# Patient Record
Sex: FEMALE | Race: WHITE | NOT HISPANIC OR LATINO | Employment: FULL TIME | URBAN - METROPOLITAN AREA
[De-identification: names, ages, dates, MRNs, and addresses within clinical notes are randomized per-mention and may not be internally consistent; named-entity substitution may affect disease eponyms.]

---

## 2017-01-13 ENCOUNTER — OFFICE VISIT (OUTPATIENT)
Dept: URGENT CARE | Age: 52
End: 2017-01-13
Payer: COMMERCIAL

## 2017-01-13 PROCEDURE — 99203 OFFICE O/P NEW LOW 30 MIN: CPT | Performed by: FAMILY MEDICINE

## 2017-01-17 ENCOUNTER — ALLSCRIPTS OFFICE VISIT (OUTPATIENT)
Dept: OTHER | Facility: OTHER | Age: 52
End: 2017-01-17

## 2017-02-10 ENCOUNTER — ALLSCRIPTS OFFICE VISIT (OUTPATIENT)
Dept: OTHER | Facility: OTHER | Age: 52
End: 2017-02-10

## 2017-02-10 DIAGNOSIS — E78.5 HYPERLIPIDEMIA: ICD-10-CM

## 2017-03-27 ENCOUNTER — ALLSCRIPTS OFFICE VISIT (OUTPATIENT)
Dept: OTHER | Facility: OTHER | Age: 52
End: 2017-03-27

## 2017-06-20 ENCOUNTER — TRANSCRIBE ORDERS (OUTPATIENT)
Dept: LAB | Facility: HOSPITAL | Age: 52
End: 2017-06-20

## 2017-06-20 DIAGNOSIS — Z00.8 ENCOUNTER FOR OTHER GENERAL EXAMINATION: Primary | ICD-10-CM

## 2017-06-21 ENCOUNTER — APPOINTMENT (OUTPATIENT)
Dept: LAB | Facility: HOSPITAL | Age: 52
End: 2017-06-21
Payer: COMMERCIAL

## 2017-06-21 DIAGNOSIS — Z00.8 ENCOUNTER FOR OTHER GENERAL EXAMINATION: ICD-10-CM

## 2017-06-21 LAB
CHOLEST SERPL-MCNC: 189 MG/DL (ref 50–200)
EST. AVERAGE GLUCOSE BLD GHB EST-MCNC: 111 MG/DL
HBA1C MFR BLD: 5.5 % (ref 4.2–6.3)
HDLC SERPL-MCNC: 55 MG/DL (ref 40–60)
LDLC SERPL CALC-MCNC: 119 MG/DL (ref 0–100)
TRIGL SERPL-MCNC: 74 MG/DL

## 2017-06-21 PROCEDURE — 83036 HEMOGLOBIN GLYCOSYLATED A1C: CPT

## 2017-06-21 PROCEDURE — 80061 LIPID PANEL: CPT

## 2017-06-21 PROCEDURE — 36415 COLL VENOUS BLD VENIPUNCTURE: CPT

## 2017-07-05 PROCEDURE — 88305 TISSUE EXAM BY PATHOLOGIST: CPT | Performed by: PHYSICIAN ASSISTANT

## 2017-07-07 ENCOUNTER — LAB REQUISITION (OUTPATIENT)
Dept: LAB | Facility: HOSPITAL | Age: 52
End: 2017-07-07
Payer: COMMERCIAL

## 2017-07-07 DIAGNOSIS — L82.0 INFLAMED SEBORRHEIC KERATOSIS: ICD-10-CM

## 2017-08-10 ENCOUNTER — ALLSCRIPTS OFFICE VISIT (OUTPATIENT)
Dept: OTHER | Facility: OTHER | Age: 52
End: 2017-08-10

## 2017-09-27 ENCOUNTER — ALLSCRIPTS OFFICE VISIT (OUTPATIENT)
Dept: OTHER | Facility: OTHER | Age: 52
End: 2017-09-27

## 2017-09-27 DIAGNOSIS — N94.6 DYSMENORRHEA: ICD-10-CM

## 2017-12-14 ENCOUNTER — ALLSCRIPTS OFFICE VISIT (OUTPATIENT)
Dept: OTHER | Facility: OTHER | Age: 52
End: 2017-12-14

## 2017-12-15 NOTE — PROGRESS NOTES
Assessment  1  URI (upper respiratory infection) (465 9) (J06 9)    Plan  Chronic migraine without aura    · ZOLMitriptan 5 MG Oral Tablet    Discussion/Summary    Tylenol, rest fluids, no need for antibiotics at this time  The treatment plan was reviewed with the patient/guardian  The patient/guardian understands and agrees with the treatment plan      Chief Complaint  Pt here for sore throat, sinus pressure, headache, body chills  History of Present Illness  Cold Symptoms:   Von Flor presents with complaints of sudden onset of constant episodes of moderate cold symptoms  Her symptoms are caused by work contact with URI  Symptoms are worsening  Risk Factors: high exposure occupation  Associated symptoms include nasal congestion,-- post nasal drainage,-- sore throat,-- dry cough,-- headache-- and-- chills, but-- no fever  Review of Systems   Constitutional: feeling poorly-- and-- chills, but-- no fever  ENT: earache,-- sore throat-- and-- nasal discharge  Respiratory: cough  Gastrointestinal: no complaints of abdominal pain, no constipation, no nausea or diarrhea, no vomiting, no bloody stools  Neurological: headache  Active Problems  1  Back strain (847 9) (S39 012A)   2  Chronic migraine without aura (346 70) (G43 709)   3  Chronic tension type headache (339 12) (G44 229)   4  Classic migraine with aura (346 00) (G43 109)   5  Colon cancer screening (V76 51) (Z12 11)   6  Dysmenorrhea (625 3) (N94 6)   7  Encounter for gynecological examination without abnormal finding (V72 31) (Z01 419)   8  Encounter for screening mammogram for malignant neoplasm of breast (V76 12) (Z12 31)   9  Fibromyalgia (729 1) (M79 7)   10  Hyperlipidemia (272 4) (E78 5)   11  Impaired fasting glucose (790 21) (R73 01)   12  Nummular dermatitis (692 9) (L30 0)   13  Obesity (278 00) (E66 9)   14  Other muscle spasm (728 85) (M62 838)   15  Rhus dermatitis (692 6) (L23 7)   16   Routine Gynecological Exam With Cervical Pap Smear (V72 31)   17  Tenderness of hip, left (719 45) (M25 552)   18  Tenderness of hip, right (719 45) (M25 551)   19  Vitamin D insufficiency (268 9) (E55 9)    Past Medical History  1  History of Elevated AST (SGOT) (790 4) (R74 0)   2  History of gestational diabetes (V12 21) (Z86 32)   3  History of low back pain (V13 59) (Z87 39)   4  History of obesity (V12 29) (Z86 39)   5  History of vacuum extraction assisted delivery (V45 89) (Z98 890)   6  History of Lateral epicondylitis of right elbow (726 32) (M77 11)   7  History of Migraine (346 90) (G43 909)   8  History of Other acute sinusitis (461 8) (J01 80)   9  History of Ovarian cyst (620 2) (N83 20)   10  History of Sebopsoriasis (692 9) (L30 9)   11  History of Summary Of Previous Pregnancies  1  (Total No )  Active Problems And Past Medical History Reviewed: The active problems and past medical history were reviewed and updated today  Family History  Mother    1  Family history of Anxiety and depression   2  Family history of Hypertension (V17 49)  Father    3  Family history of Hypertension (V17 49)  Brother    4  Family history of Hypertension   5  Family history of Psoriasis  Maternal Grandmother    6  Family history of Alzheimer Disease   7  Family history of Bone Cancer  Paternal Grandmother    6  Family history of Stroke Syndrome (V17 1)  Maternal Grandfather    9  Family history of Acute Myocardial Infarction (V17 3)   10  Family history of Psoriasis  Paternal Grandfather    6  Family history of Diabetes Mellitus (V18 0)  Paternal Uncle    15  Family history of Coronary Artery Disease (V17 49)  Family History Reviewed: The family history was reviewed and updated today         Social History   · Aerobic classes   · Being A Social Drinker   · Denied: History of Coffee   · Daily Tea Consumption (1  Cups/Day)   · Drinks beer (V49 89) (Z78 9)   · Denied: History of Drug Use   · Exercise: Walking   · Exercises moderately less than 3 times a week   · Former smoker (V15 82) (U83 975)   · Denied: History of domestic violence   ·    · Sexually Active   · Wine Consumption (1  Glasses/Day)  The social history was reviewed and updated today  Surgical History  1  History of Laparoscopy (Diagnostic)   2  History of Tubal Ligation  Surgical History Reviewed: The surgical history was reviewed and updated today  Current Meds   1  Atorvastatin Calcium 10 MG Oral Tablet; TAKE 1 TABLET BY MOUTH DAILY AS DIRECTED; Therapy: 65JLO3206 to (Evaluate:36Gbx5072)  Requested for: 50Wpn1303; Last Rx:24Drd1897 Ordered   2  B-2 100 MG Oral Tablet; Therapy: 56Nal9889 to Recorded   3  Biotin 300 MCG Oral Tablet; Therapy: 63Nzy0365 to Recorded   4  Calcium TABS; Therapy: (Tilda Dilling) to Recorded   5  Protriptyline HCl - 10 MG Oral Tablet; Take 1 tablet daily; Therapy: 62FVX7096 to (Evaluate:79Znw9200)  Requested for: 10Aug2017; Last Rx:05Uiw1780 Ordered   6  Vitamin D 1000 UNIT CAPS; Therapy: (Tilda Dilling) to Recorded   7  ZOLMitriptan 5 MG Oral Tablet; TAKE 1 TABLET AT ONSET OF HEADACHE, MAY REPEAT ONCE AFTER 2 HOURS, MAX OF 2 TABLETS PER DAY; Therapy: 02UQC7140 to (Evaluate:09Oct2017)  Requested for: 10Aug2017; Last Rx:40Nkw4235 Ordered    The medication list was reviewed and updated today  Allergies  1  No Known Drug Allergies  2  No Known Environmental Allergies   3  No Known Food Allergies    Vitals   Recorded: 58UWZ3909 12:12PM   Temperature 97 7 F, Temporal   Heart Rate 98   Respiration 16   Systolic 432, Sitting   Diastolic 902, Sitting   Height 5 ft 8 in   Weight 217 lb 8 oz   BMI Calculated 33 07   BSA Calculated 2 12     Physical Exam   Constitutional  General appearance: Abnormal   acutely ill,-- uncomfortable-- and-- obese  Ears, Nose, Mouth, and Throat  Otoscopic examination: Tympanic membranes translucent with normal light reflex  Canals patent without erythema     Nasal mucosa, septum, and turbinates: Abnormal  The bilateral nasal mucosa was boggy-- and-- edematous  -- mild facial swelling  Oropharynx: Normal with no erythema, edema, exudate or lesions  Pulmonary  Auscultation of lungs: Clear to auscultation  Lymphatic  Palpation of lymph nodes in neck: No lymphadenopathy  Message  Return to work or school:   Shaniqua Steven is under my professional care  She was seen in my office on 12/14/17   She is able to return to work on  12/18/17            Future Appointments    Date/Time Provider Specialty Site   02/21/2018 03:30 PM Bri Andersen MD [facility] 21     Signatures   Electronically signed by :  Michele Casiano MD; Dec 14 2017 12:56PM EST                       (Author)

## 2017-12-18 ENCOUNTER — TRANSCRIBE ORDERS (OUTPATIENT)
Dept: RADIOLOGY | Facility: HOSPITAL | Age: 52
End: 2017-12-18

## 2017-12-18 ENCOUNTER — ALLSCRIPTS OFFICE VISIT (OUTPATIENT)
Dept: OTHER | Facility: OTHER | Age: 52
End: 2017-12-18

## 2017-12-18 ENCOUNTER — HOSPITAL ENCOUNTER (OUTPATIENT)
Dept: RADIOLOGY | Facility: HOSPITAL | Age: 52
Discharge: HOME | End: 2017-12-18
Payer: COMMERCIAL

## 2017-12-18 DIAGNOSIS — J04.0 ACUTE LARYNGITIS: ICD-10-CM

## 2017-12-18 PROCEDURE — 71020 HB CHEST X-RAY 2VW FRONTAL&LATL: CPT

## 2017-12-20 NOTE — PROGRESS NOTES
Assessment  1  Cough (786 2) (R05)  2  Acute laryngitis (464 00) (J04 0)    Plan  Acute laryngitis    · Start: Azithromycin 500 MG Oral Tablet; TAKE 1 TABLET DAILY UNTIL FINISHED   · * XR CHEST PA & LATERAL; Status:Active; Requested for:33Mqa4333;     Chief Complaint  was in last week sx's not improving cough/headaches/loss of voice/body aches/chills/sinus pressure      History of Present Illness  recheck from previous visit, pt feels worse  now with laryngitis  and feels tight in chest with activity not really coughing1        1 Amended By: Blanca Winter; Dec 19 2017 9:36 AM EST    Review of Systems   Constitutional:1  feeling poorly1   Eyes:1  No complaints of eye pain, no red eyes, no eyesight problems, no discharge, no dry eyes, no itching of eyes1   ENT:1  hoarseness1 , but-- no complaints of earache, no loss of hearing, no nose bleeds, no nasal discharge, no sore throat, no hoarseness1   Cardiovascular:1  No complaints of slow heart rate, no fast heart rate, no chest pain, no palpitations, no leg claudication, no lower extremity edema1   Respiratory:1  shortness of breath during exertion1   Gastrointestinal:1  No complaints of abdominal pain, no constipation, no nausea or vomiting, no diarrhea, no bloody stools1   Genitourinary:1  No complaints of dysuria, no incontinence, no pelvic pain, no dysmenorrhea, no vaginal discharge or bleeding1   Musculoskeletal:1  No complaints of arthralgias, no myalgias, no joint swelling or stiffness, no limb pain or swelling1   Integumentary:1  No complaints of skin rash or lesions, no itching, no skin wounds, no breast pain or lump1   Neurological:1  No complaints of headache, no confusion, no convulsions, no numbness, no dizziness or fainting, no tingling, no limb weakness, no difficulty walking1   Psychiatric:1  Not suicidal, no sleep disturbance, no anxiety or depression, no change in personality, no emotional problems1     Endocrine:1  No complaints of proptosis, no hot flashes, no muscle weakness, no deepening of the voice, no feelings of weakness1   Hematologic/Lymphatic:1  No complaints of swollen glands, no swollen glands in the neck, does not bleed easily, does not bruise easily1         1 Amended By: Oliver Smith; Dec 19 2017 9:39 AM EST    Active Problems  1  Back strain (847 9) (S39 012A)  2  Chronic migraine without aura (346 70) (G43 709)  3  Chronic tension type headache (339 12) (G44 229)  4  Classic migraine with aura (346 00) (G43 109)  5  Colon cancer screening (V76 51) (Z12 11)  6  Dysmenorrhea (625 3) (N94 6)  7  Encounter for gynecological examination without abnormal finding (V72 31) (Z01 419)  8  Encounter for screening mammogram for malignant neoplasm of breast (V76 12) (Z12 31)  9  Fibromyalgia (729 1) (M79 7)  10  Hyperlipidemia (272 4) (E78 5)  11  Impaired fasting glucose (790 21) (R73 01)  12  Nummular dermatitis (692 9) (L30 0)  13  Obesity (278 00) (E66 9)  14  Other muscle spasm (728 85) (M62 838)  15  Rhus dermatitis (692 6) (L23 7)  16  Routine Gynecological Exam With Cervical Pap Smear (V72 31)  17  Tenderness of hip, left (719 45) (M25 552)  18  Tenderness of hip, right (719 45) (M25 551)  19  URI (upper respiratory infection) (465 9) (J06 9)  20  Vitamin D insufficiency (268 9) (E55 9)    Past Medical History  1  History of Elevated AST (SGOT) (790 4) (R74 0)  2  History of gestational diabetes (V12 21) (Z86 32)  3  History of low back pain (V13 59) (Z87 39)  4  History of obesity (V12 29) (Z86 39)  5  History of vacuum extraction assisted delivery (V45 89) (Z98 890)  6  History of Lateral epicondylitis of right elbow (726 32) (M77 11)  7  History of Migraine (346 90) (G43 909)  8  History of Other acute sinusitis (461 8) (J01 80)  9  History of Ovarian cyst (620 2) (N83 20)  10  History of Sebopsoriasis (692 9) (L30 9)  11   History of Summary Of Previous Pregnancies  1  (Total No )    The active problems and past medical history were reviewed and updated today  Surgical History  1  History of Laparoscopy (Diagnostic)  2  History of Tubal Ligation    Family History  Mother   1  Family history of Anxiety and depression  2  Family history of Hypertension (V17 49)  Father   3  Family history of Hypertension (V17 49)  Brother   4  Family history of Hypertension  5  Family history of Psoriasis  Maternal Grandmother   6  Family history of Alzheimer Disease  7  Family history of Bone Cancer  Paternal Grandmother   6  Family history of Stroke Syndrome (V17 1)  Maternal Grandfather   9  Family history of Acute Myocardial Infarction (V17 3)  10  Family history of Psoriasis  Paternal Grandfather   6  Family history of Diabetes Mellitus (V18 0)  Paternal Uncle   15  Family history of Coronary Artery Disease (V17 49)    Social History   · Aerobic classes   · Being A Social Drinker   · Denied: History of Coffee   · Daily Tea Consumption (1  Cups/Day)   · Drinks beer (V49 89) (Z78 9)   · Denied: History of Drug Use   · Exercise: Walking   · Exercises moderately less than 3 times a week   · Former smoker (V15 82) (Z87 891)   · Denied: History of domestic violence   ·    · Sexually Active   · Wine Consumption (1  Glasses/Day)  The social history was reviewed and updated today  The social history was reviewed and is unchanged  Current Meds  1  Atorvastatin Calcium 10 MG Oral Tablet; TAKE 1 TABLET BY MOUTH DAILY AS DIRECTED; Therapy: 98AMR8224 to (Evaluate:45Bxx8837)  Requested for: 67Tbm5721; Last Rx:83Yhv4461 Ordered  2  B-2 100 MG Oral Tablet; Therapy: 49Foz7469 to Recorded  3  Biotin 300 MCG Oral Tablet; Therapy: 76Gck7534 to Recorded  4  Calcium TABS; Therapy: (Aleyda Casillas) to Recorded  5  Protriptyline HCl - 10 MG Oral Tablet; Take 1 tablet daily; Therapy: 53BQR3383 to (Evaluate:87Eja0361)  Requested for: 10Aug2017; Last Rx:10Aug2017 Ordered  6  Vitamin D 1000 UNIT CAPS;  Therapy: (Aleyda Casillas) to Recorded    The medication list was reviewed and updated today  Allergies  1  No Known Drug Allergies  2  No Known Environmental Allergies  3  No Known Food Allergies    Vitals  Vital Signs    Recorded: 93DDA1427 11:30AM   Temperature 97 9 F   Heart Rate 84   Respiration 16   Systolic 544   Diastolic 92   Height 5 ft 8 in   Weight 217 lb 12 8 oz   BMI Calculated 33 12   BSA Calculated 2 12     Physical Exam   Constitutional1   General appearance: No acute distress, well appearing and well nourished  1   Eyes1   Pupils and irises: Equal, round and reactive to light  1   Ears, Nose, Mouth, and Throat  Otoscopic examination: Abnormal  1  The right tympanic membrane1  had a diminished light reflex1   The right external canal1  was normal1   The left external canal1  was normal1   Oropharynx: Normal with no erythema, edema, exudate or lesions  1   Pulmonary1   Auscultation of lungs: Abnormal  1  Auscultation of the lungs revealed1  decreased breath sounds diffusely1   Cardiovascular1   Auscultation of heart: Normal rate and rhythm, normal S1 and S2, without murmurs  1   Examination of extremities for edema and/or varicosities: Normal 1   Lymphatic1   Palpation of lymph nodes in neck: No lymphadenopathy  1   Skin1   Skin and subcutaneous tissue: Normal without rashes or lesions  1          1 Amended By: Chela Guzman; Dec 19 2017 9:40 AM EST    Health Management  Colon cancer screening   COLONOSCOPY (GI, SURG); every 5 years; Last 10CWV8933; Next Due: 70TVT0706; Active  Encounter for preventive health examination   PELVIC EXAM; every 1 year; Next Due: 91CSI4099; Overdue  Encounter for screening mammogram for malignant neoplasm of breast   Digital Bilateral Screening Mammogram With CAD; every 1 year; Last 69DEF6170; Next Due:05Fwh4517; Overdue  History of Screening for human papillomavirus (HPV)   (1) THIN PREP PAP WITH IMAGING; every 3 years; Last 12HTP8026; Next Due: 52Fzf1923;  Overdue  Routine Gynecological Exam With Cervical Pap Smear   (1) THIN PREP PAP WITH IMAGING; every 3 years; Last 56DUN1681; Next Due: 39Jac0736; Overdue    Message    Shaniqua Steven is under my professional care   She was seen in my office on 112/18/2017   She is able to return to work on  12/21/2017            Future Appointments    Date/Time Provider Specialty Site   02/21/2018 03:30 PM Bri Andersen MD Neurology [facility] 21     Signatures   Electronically signed by : Fredy Flores DO; Dec 19 2017  9:41AM EST                       (Author)

## 2017-12-26 ENCOUNTER — GENERIC CONVERSION - ENCOUNTER (OUTPATIENT)
Dept: OTHER | Facility: OTHER | Age: 52
End: 2017-12-26

## 2017-12-29 ENCOUNTER — APPOINTMENT (OUTPATIENT)
Dept: LAB | Facility: CLINIC | Age: 52
End: 2017-12-29
Payer: COMMERCIAL

## 2017-12-29 DIAGNOSIS — N94.6 DYSMENORRHEA: ICD-10-CM

## 2017-12-29 LAB
FSH SERPL-ACNC: 11.8 MIU/ML
LH SERPL-ACNC: 5.4 MIU/ML

## 2017-12-29 PROCEDURE — 36415 COLL VENOUS BLD VENIPUNCTURE: CPT

## 2017-12-29 PROCEDURE — 83002 ASSAY OF GONADOTROPIN (LH): CPT

## 2017-12-29 PROCEDURE — 83001 ASSAY OF GONADOTROPIN (FSH): CPT

## 2017-12-31 ENCOUNTER — GENERIC CONVERSION - ENCOUNTER (OUTPATIENT)
Dept: OTHER | Facility: OTHER | Age: 52
End: 2017-12-31

## 2018-01-09 NOTE — RESULT NOTES
Verified Results  (1) TISSUE EXAM 63FQP6376 12:28PM Isa Bain     Test Name Result Flag Reference   LAB AP CASE REPORT (Report)     Surgical Pathology Report             Case: [id number]                   Authorizing Provider: Manjula Boland MD      Collected:      09/30/2016 1228        Ordering Location:   [address] [place] [address]   Received:      09/30/2016 [facility]                               Pathologist:      Carolann Bee MD                             Specimens:  A) - Polyp, Colorectal, ascending colon                                B) - Polyp, Colorectal, transverse colon                                C) - Polyp, Colorectal, sigmoid colon                                 D) - Polyp, Colorectal, sigmoid colon #2   LAB AP FINAL DIAGNOSIS (Report)     A  Colon, ascending polyp, biopsy:  - Diminutive tubular adenoma, negative for high-grade dysplasia  B  Colon, transverse polyp, biopsy:  - Polypoid portions of colonic mucosa with features most consistent with   early hyperplastic changes, negative for dysplasia or carcinoma  C  Colon, sigmoid polyp, biopsy:  - Benign colonic mucosa, negative for hyperplasia, dysplasia or   carcinoma  D  Colon, sigmoid polyp #2, biopsy:  - Tubular adenoma, negative for high-grade dysplasia  Electronically signed by Carolann Bee MD on 10/4/2016 at 4:02 PM   LAB AP SURGICAL ADDITIONAL INFORMATION (Report)     These tests were developed and their performance characteristics   determined by [facility] or [address] [place] [address]  They may not be cleared or approved by the [place]  Food and   Drug Administration  The FDA has determined that such clearance or   approval is not necessary  These tests are used for clinical purposes  They should not be regarded as investigational or for research   This   laboratory has been approved by [place] [address], designated as a high-complexity   laboratory and is qualified to perform these tests  Interpretation performed at [facility]   LAB AP GROSS DESCRIPTION (Report)     A  The specimen is received in formalin, labeled with the patient's name   and hospital number, and is designated ascending colon polyp  The   specimen consists of a single tan soft tissue fragment measuring 0 3 cm  Entirely submitted  One cassette  B  The specimen is received in formalin, labeled with the patient's name   and hospital number, and is designated transverse colon polyp  The   specimen consists of 3 tan soft tissue fragments each measuring 0 2-0 3   cm  Entirely submitted  One cassette  C  The specimen is received in formalin, labeled with the patient's name   and hospital number, and is designated sigmoid colon polyp  The   specimen consists of 2 tan soft tissue fragments each measuring 0 2 cm  Entirely submitted  One cassette  D  The specimen is received in formalin, labeled with the patient's name   and hospital number, and is designated sigmoid colon polyp #2  The   specimen consists of a single tan soft tissue fragment measuring 0 4 cm  Entirely submitted  One cassette  Note: The estimated total formalin fixation time based upon information   provided by the submitting clinician and the standard processing schedule   is 16 0 hours      MAC

## 2018-01-11 NOTE — RESULT NOTES
Verified Results  (1) RHEUMATOID FACTOR SCREEN 32Iod0005 11:46AM Deepthi Argeulles Order Number: [id number]     Test Name Result Flag Reference   RHEUMATOID FACTOR Negative  Negative

## 2018-01-12 VITALS
HEART RATE: 86 BPM | OXYGEN SATURATION: 98 % | SYSTOLIC BLOOD PRESSURE: 132 MMHG | DIASTOLIC BLOOD PRESSURE: 78 MMHG | BODY MASS INDEX: 32.46 KG/M2 | WEIGHT: 210.38 LBS

## 2018-01-13 VITALS
TEMPERATURE: 98.8 F | DIASTOLIC BLOOD PRESSURE: 86 MMHG | SYSTOLIC BLOOD PRESSURE: 134 MMHG | BODY MASS INDEX: 31.86 KG/M2 | HEIGHT: 68 IN | WEIGHT: 210.2 LBS | HEART RATE: 84 BPM

## 2018-01-13 VITALS
WEIGHT: 216.19 LBS | DIASTOLIC BLOOD PRESSURE: 84 MMHG | SYSTOLIC BLOOD PRESSURE: 130 MMHG | RESPIRATION RATE: 16 BRPM | HEART RATE: 99 BPM | BODY MASS INDEX: 32.76 KG/M2 | HEIGHT: 68 IN

## 2018-01-14 VITALS
DIASTOLIC BLOOD PRESSURE: 102 MMHG | HEART RATE: 82 BPM | HEIGHT: 68 IN | WEIGHT: 214.5 LBS | BODY MASS INDEX: 32.51 KG/M2 | TEMPERATURE: 98.1 F | SYSTOLIC BLOOD PRESSURE: 142 MMHG | RESPIRATION RATE: 16 BRPM

## 2018-01-14 VITALS
HEIGHT: 68 IN | SYSTOLIC BLOOD PRESSURE: 126 MMHG | BODY MASS INDEX: 32.17 KG/M2 | DIASTOLIC BLOOD PRESSURE: 80 MMHG | WEIGHT: 212.25 LBS

## 2018-01-14 NOTE — RESULT NOTES
Verified Results  (1) SED RATE 37Esv1698 11:46AM Deepthi Arguelles Order Number: [id number]     Test Name Result Flag Reference   SED RATE 14 mm/hour  0-20     (1) C-REACTIVE PROTEIN 46ANH9880 11:46AM Georgi Rios    Order Number: [id number]  TW Order Number: [id number] Order Number: [id number] Order Number: [id number] Order Number: [id number]     Test Name Result Flag Reference   C-REACT PROTEIN <3 0 mg/L  <3 0     (1) CBC/PLT/DIFF 05YLQ3188 11:46AM Deepthi Arguelles Order Number: [id number]  TW Order Number: [id number]     Test Name Result Flag Reference   WBC COUNT 4 82 Thousand/uL  4 31-10 16   RBC COUNT 4 53 Million/uL  3 81-5 12   HEMOGLOBIN 14 0 g/dL  11 5-15 4   HEMATOCRIT 40 3 %  34 8-46  1   MCV 89 fL  82-98   MCH 30 9 pg  26 8-34 3   MCHC 34 7 g/dL  31 4-37 4   RDW 13 0 %  11 6-15 1   MPV 10 8 fL  8 9-12 7   PLATELET COUNT 703 Thousands/uL  149-390   nRBC AUTOMATED 0 /100 WBCs     NEUTROPHILS RELATIVE PERCENT 63 %  43-75   LYMPHOCYTES RELATIVE PERCENT 28 %  14-44   MONOCYTES RELATIVE PERCENT 7 %  4-12   EOSINOPHILS RELATIVE PERCENT 1 %  0-6   BASOPHILS RELATIVE PERCENT 1 %  0-1   NEUTROPHILS ABSOLUTE COUNT 3 00 Thousands/?L  1 85-7 62   LYMPHOCYTES ABSOLUTE COUNT 1 37 Thousands/?L  0 60-4 47   MONOCYTES ABSOLUTE COUNT 0 35 Thousand/?L  0 17-1 22   EOSINOPHILS ABSOLUTE COUNT 0 05 Thousand/?L  0 00-0 61   BASOPHILS ABSOLUTE COUNT 0 03 Thousands/?L  0 00-0 10     (1) CK (CPK) 67HCY2255 11:46AM Georgi Rios    Order Number: [id number]  TW Order Number: [id number] Order Number: [id number] Order Number: [id number] Order Number: [id number]     Test Name Result Flag Reference   CK (CPK) 97 U/L       (1) VITAMIN D 25-HYDROXY 88FJG3298 11:46AM Deepthi Petstephen Order Number: [id number]  TW Order Number: [id number]     Test Name Result Flag Reference   VIT D 25-HYDROX 37 2 ng/mL  30 0-100 0   This assay is a certified procedure of the CDC Vitamin D Standardization Certification Program (VDSCP)     Deficiency <20ng/ml   Insufficiency 20-30ng/ml   Sufficient  ng/ml     *Patients undergoing fluorescein dye angiography may retain small amounts of fluorescein in the body for 48-72 hours post procedure  Samples containing fluorescein can produce falsely elevated Vitamin D values  If the patient had this procedure, a specimen should be resubmitted post fluorescein clearance  (1) HEMOGLOBIN A1C 58KDO0991 11:46AM Luiz Parkinsonles   TW Order Number: [id number]  TW Order Number: [id number]     Test Name Result Flag Reference   HEMOGLOBIN A1C 5 6 %  4 2-6 3   EST  AVG  GLUCOSE 114 mg/dl       (1) COMPREHENSIVE METABOLIC PANEL 37EJE8212 09:24IG Luiz Parkinsonles   TW Order Number: [id number]  TW Order Number: [id number] Order Number: [id number] Order Number: [id number] Order Number: [id number]     Test Name Result Flag Reference   GLUCOSE,RANDM 90 mg/dL     If the patient is fasting, the ADA then defines impaired fasting glucose as > 100 mg/dL and diabetes as > or equal to 123 mg/dL     SODIUM 139 mmol/L  136-145   POTASSIUM 3 9 mmol/L  3 5-5 3   CHLORIDE 103 mmol/L  100-108   CARBON DIOXIDE 27 mmol/L  21-32   ANION GAP (CALC) 9 mmol/L  4-13   BLOOD UREA NITROGEN 12 mg/dL  5-25   CREATININE 0 87 mg/dL  0 60-1 30   Standardized to IDMS reference method   CALCIUM 8 8 mg/dL  8 3-10 1   BILI, TOTAL 0 55 mg/dL  0 20-1 00   ALK PHOSPHATAS 69 U/L     ALT (SGPT) 30 U/L  12-78   AST(SGOT) 15 U/L  5-45   ALBUMIN 4 1 g/dL  3 5-5 0   TOTAL PROTEIN 7 3 g/dL  6 4-8 2   eGFR Non-African American      >60 0 ml/min/1 73sq [facility] Disease Education Program recommendations are as follows:  GFR calculation is accurate only with a steady state creatinine  Chronic Kidney disease less than 60 ml/min/1 73 sq  meters  Kidney failure less than 15 ml/min/1 73 sq  meters  (1) LIPID PANEL FASTING W DIRECT LDL REFLEX 53IXS7259 11:46AM Bob Powell Order Number: [id number]  TW Order Number: [id number] Order Number: [id number] Order Number: [id number] Order Number: [id number]     Test Name Result Flag Reference   CHOLESTEROL 177 mg/dL     LDL CHOLESTEROL CALCULATED 107 mg/dL H 0-100   Triglyceride:         Normal              <150 mg/dl       Borderline High    150-199 mg/dl       High               200-499 mg/dl       Very High          >499 mg/dl  Cholesterol:         Desirable        <200 mg/dl      Borderline High  200-239 mg/dl      High             >239 mg/dl  HDL Cholesterol:        High    >59 mg/dL      Low     <41 mg/dL  LDL Cholesterol:        Optimal          <100 mg/dl        Near Optimal     100-129 mg/dl        Above Optimal          Borderline High   130-159 mg/dl          High              160-189 mg/dl          Very High        >189 mg/dl  LDL CALCULATED:    This screening LDL is a calculated result  It does not have the accuracy of the Direct Measured LDL in the monitoring of patients with hyperlipidemia and/or statin therapy  Direct Measure LDL (ARB283) must be ordered separately in these patients  TRIGLYCERIDES 100 mg/dL  <=150   Specimen collection should occur prior to N-Acetylcysteine or Metamizole administration due to the potential for falsely depressed results  HDL,DIRECT 50 mg/dL  40-60   Specimen collection should occur prior to Metamizole administration due to the potential for falsely depressed results

## 2018-01-15 NOTE — RESULT NOTES
Verified Results  (1) THROAT CULTURE (CULTURE, UPPER RESPIRATORY) [id number] 05:41PM Russell Xiao Order Number: [id number]     Test Name Result Flag Reference   CLINICAL REPORT (Report)     Test:        Throat culture  Specimen Type:   Throat  Specimen Date:   9/7/2016 5:41 PM  Result Date:    9/9/2016 8:11 AM  Result Status:   Final result  Resulting Lab:   [address] [address]            Tel: [phone]      CULTURE                                       ------------------                                   Negative for beta-hemolytic Streptococcus

## 2018-01-16 NOTE — RESULT NOTES
Verified Results  (1) PILAR SCREEN W/REFLEX TO TITER/PATTERN [id number] 11:46AM Doug Guzman Order Number: [id number]     Test Name Result Flag Reference   PILAR SCREEN   Negative  Negative

## 2018-01-22 VITALS
HEART RATE: 84 BPM | BODY MASS INDEX: 33.01 KG/M2 | WEIGHT: 217.8 LBS | TEMPERATURE: 97.9 F | DIASTOLIC BLOOD PRESSURE: 92 MMHG | SYSTOLIC BLOOD PRESSURE: 132 MMHG | RESPIRATION RATE: 16 BRPM | HEIGHT: 68 IN

## 2018-01-23 VITALS
RESPIRATION RATE: 16 BRPM | WEIGHT: 217.5 LBS | HEART RATE: 98 BPM | HEIGHT: 68 IN | BODY MASS INDEX: 32.96 KG/M2 | DIASTOLIC BLOOD PRESSURE: 110 MMHG | SYSTOLIC BLOOD PRESSURE: 138 MMHG | TEMPERATURE: 97.7 F

## 2018-01-23 NOTE — MISCELLANEOUS
Message  Return to work or school:   [employer] is under my professional care  She was seen in my office on 12/14/17   She is able to return to work on  12/18/17            Future Appointments    Signatures   Electronically signed by :  Ramiro Rios MD; Dec 14 2017 12:56PM EST                       (Author)

## 2018-01-23 NOTE — MISCELLANEOUS
Message  Return to work or school:   Chidi Mcdowell is under my professional care   She was seen in my office on 112/18/2017   She is able to return to work on  12/21/2017            Future Appointments    Signatures   Electronically signed by : Mir Jacques DO; Dec 18 2017 12:25PM EST                       (Author)    Electronically signed by : Mir Jacques DO; Dec 19 2017  9:41AM EST                       (Author)

## 2018-01-23 NOTE — RESULT NOTES
Verified Results  (1) [address] [facility] [id number] 09:44AM Ingrid [facility] Order Number: [id number]     Test Name Result Flag Reference   FOLLICLE STIMULATING HORMONE 11 8 mIU/mL     FSH:  Menstruating Females: Follicular Phase  5 9-55 9 mIU/mL    Mid-Cycle Phase   5 2-17 5 mIU/mL    Luteal Phase      1 7-9 5  mIU/mL  Postmenopausal Females    On Menopausal Hormone Therapy(HRT) 5 9-72 8   mIU/mL    Untreated                          12 7-132 2 mIU/mL     (1) LH (LEUTINIZING HORMONE) [id number] 09:44AM Ingrid [facility] Order Number: [id number]     Test Name Result Flag Reference   LUTEINIZING HORMONE 5 4 mIU/mL     LH:   Menstruating Females: Follicular [id number] 0-68  8mIU/mL    Mid-Cycle Phase 22 8-76 1 mIU/mL    Luteal Phase0 6-13  5mIU/mL   Postmenopausal Females:     On Menopausal Hormone Therapy(MHT) 1 1-52 4 mIU/mL    Untreated8 6-61 8 mIU/mL

## 2018-01-23 NOTE — RESULT NOTES
Verified Results  * XR CHEST PA & LATERAL [id number] 01:15PM Ayala Saucedo Order Number: [id number]     Test Name Result Flag Reference   XR CHEST PA & LATERAL (Report)     CHEST 2 View     INDICATION: Laryngitis for a week  COMPARISON: 2/5/2006     VIEWS: PA and lateral projections; 2 images     FINDINGS:        Cardiomediastinal silhouette appears stable  The lungs are clear  No pneumothorax or pleural effusion  Visualized osseous structures appear within normal limits for the patient's age  IMPRESSION:     No active pulmonary disease  Workstation performed: [id number]     Signed by:    Sarahy Rodriguez MD   12/20/17

## 2018-03-12 RX ORDER — LANOLIN ALCOHOL/MO/W.PET/CERES
300 CREAM (GRAM) TOPICAL DAILY
COMMUNITY
Start: 2016-08-25

## 2018-03-12 RX ORDER — ZOLMITRIPTAN 5 MG/1
TABLET, FILM COATED ORAL
COMMUNITY
Start: 2017-08-10 | End: 2018-03-14 | Stop reason: ALTCHOICE

## 2018-03-12 RX ORDER — THIAMINE HCL 100 MG
TABLET ORAL DAILY
COMMUNITY
Start: 2016-08-25

## 2018-03-14 ENCOUNTER — OFFICE VISIT (OUTPATIENT)
Dept: NEUROLOGY | Facility: CLINIC | Age: 53
End: 2018-03-14
Payer: COMMERCIAL

## 2018-03-14 VITALS
HEIGHT: 68 IN | SYSTOLIC BLOOD PRESSURE: 130 MMHG | BODY MASS INDEX: 32.54 KG/M2 | WEIGHT: 214.7 LBS | DIASTOLIC BLOOD PRESSURE: 84 MMHG

## 2018-03-14 DIAGNOSIS — G43.009 MIGRAINE WITHOUT AURA AND WITHOUT STATUS MIGRAINOSUS, NOT INTRACTABLE: ICD-10-CM

## 2018-03-14 DIAGNOSIS — G43.709 CHRONIC MIGRAINE WITHOUT AURA WITHOUT STATUS MIGRAINOSUS, NOT INTRACTABLE: Primary | ICD-10-CM

## 2018-03-14 DIAGNOSIS — G44.219 EPISODIC TENSION-TYPE HEADACHE, NOT INTRACTABLE: ICD-10-CM

## 2018-03-14 DIAGNOSIS — M54.2 CERVICALGIA: ICD-10-CM

## 2018-03-14 DIAGNOSIS — R79.89 LOW VITAMIN D LEVEL: ICD-10-CM

## 2018-03-14 PROBLEM — G44.209 TENSION TYPE HEADACHE: Status: ACTIVE | Noted: 2018-03-14

## 2018-03-14 PROCEDURE — 99214 OFFICE O/P EST MOD 30 MIN: CPT | Performed by: PSYCHIATRY & NEUROLOGY

## 2018-03-14 RX ORDER — DEXAMETHASONE 2 MG/1
TABLET ORAL
Qty: 5 TABLET | Refills: 0 | Status: SHIPPED | OUTPATIENT
Start: 2018-03-14 | End: 2018-04-16 | Stop reason: ALTCHOICE

## 2018-03-14 NOTE — PATIENT INSTRUCTIONS
Preventive;   - magnesium oxide 400 mg once a day  - B 2 200 mg once a day  - Protriptyline 10 mg in am daily  Abortive;   - Tylenol 500g 2 tabs at onset and may repeat in 1-2 hours to abort the headaches  If persists for 24 hours then take decadron 2 mg  Lab work ordered

## 2018-03-14 NOTE — PROGRESS NOTES
Patient ID: Austyn Horton is a 47 y o  female  Assessment/Plan:   Problem List Items Addressed This Visit        Cardiovascular and Mediastinum    Chronic migraine without aura without status migrainosus, not intractable - Primary    Relevant Medications    ZOLMitriptan (ZOMIG) 5 MG tablet    Migraine without aura and without status migrainosus, not intractable    Relevant Medications    ZOLMitriptan (ZOMIG) 5 MG tablet       Other    Tension type headache    Relevant Medications    ZOLMitriptan (ZOMIG) 5 MG tablet    Cervicalgia         Preventive;   - magnesium oxide 400 mg once a day  - B 2 200 mg once a day  - Protriptyline 10 mg in am daily  Abortive;   - Tylenol 500g 2 tabs at onset and may repeat in 1-2 hours to abort the headaches  If persists for 24 hours then take decadron 2 mg  Subjective:    HPI   We had the pleasure of evaluating Austyn Horton in neurological follow up today  As you know she is a 47year old right handed female  Neck pain:         -  She states she is doing better in regards to her headaches  Chronic Migraine headaches:   PREVENTIVE: mag, B2, protriptyline   ABORTIVE: naproxen, tylenol, excedrin migraine, Fioricet- not effective  Decadron, She has noted that just taking tyloneol will abort her headaches  If the headahde persists until the next day then she should take a decadron in the morning  Non-Medical/Alternative Treatments used in the past for headaches: none  Headache trigger: stress/tension, fatigue, lack of sleep    How often do the headaches occur -   Since last seen she states she had one headaches which lasted for a week and had to take decadorn 2mg for 5 days     Migraines- 1-2 a month  TTH- once times a week due to work and she is staring at the computer now  What time of the day do the headaches start - evening  How long do the headaches last - TTH- 2 hours; migraines- 2 hr to all day  Where are they located - TTH/migraines- b/l orbits, bilateral frontal  What is the intensity of pain - TTH- average pain level 2-3/10; migraines- up to 4-5/10  Any warning prior to headache and how long do they last - nausea, previously had floaters prior to headache  Describe your usual headache - TTH- pressure; migraines- sharp  Associated symptoms: nausea, lacrimation, vomiting, prefer to lay in dark room, fatigue    The following portions of the patient's history were reviewed and updated as appropriate: allergies, current medications, past family history, past medical history, past social history, past surgical history and problem list        Objective:  Blood pressure 130/84, height 5' 8" (1 727 m), weight 97 4 kg (214 lb 11 2 oz)  Physical Exam   Constitutional: She appears well-developed  Eyes: EOM are normal  Pupils are equal, round, and reactive to light  Neck: Normal range of motion  Neck supple  Cardiovascular: Normal rate  Pulmonary/Chest: Effort normal    Abdominal: Soft  Musculoskeletal: Normal range of motion  Neurological: She has normal strength and normal reflexes  Gait and coordination normal    Skin: Skin is warm  Psychiatric: She has a normal mood and affect  Her speech is normal      Neurological Exam    Mental Status  The patient is alert and disoriented to person, place and time  Her speech is normal  Her language is fluent with no aphasia  She has normal attention span and concentration  Cranial Nerves    CN II: The patient's visual acuity and visual fields are normal   CN III, IV, VI: The patient's pupils are equally round and reactive to light and ocular movements are normal   CN V: The patient has normal facial sensation  CN VII:  The patient has symmetric facial movement  CN VIII:  The patient's hearing is normal   CN IX, X: The patient has symmetric palate movement and normal gag reflex    CN XI: The patient's shoulder shrug strength is normal   CN XII: The patient's tongue is midline without atrophy or fasciculations  Motor  The patient has normal muscle bulk throughout  Her overall muscle tone is normal throughout  Her strength is 5/5 throughout all four extremities  Sensory  The patient's sensation is normal in all four extremities  Reflexes  Deep tendon reflexes are 2+ and symmetric in all four extremities with downgoing toes bilaterally  Gait and Coordination  The patient has normal gait and station and normal casual, toe, heel, and tandem gait  She has normal coordination bilaterally  ROS:  Review of Systems   Constitutional: Negative  Negative for appetite change and fever  HENT: Negative  Negative for hearing loss, tinnitus, trouble swallowing and voice change  Eyes: Negative  Negative for photophobia and pain  Respiratory: Negative  Negative for shortness of breath  Cardiovascular: Negative  Negative for palpitations  Gastrointestinal: Negative  Negative for nausea and vomiting  Endocrine: Negative  Negative for cold intolerance and heat intolerance  Genitourinary: Negative  Negative for dysuria, frequency and urgency  Musculoskeletal: Positive for arthralgias  Negative for myalgias and neck pain  Skin: Negative  Negative for rash  Neurological: Positive for headaches  Negative for dizziness, tremors, seizures, syncope, facial asymmetry, speech difficulty, weakness, light-headedness and numbness  Hematological: Bruises/bleeds easily  Psychiatric/Behavioral: Negative  Negative for confusion, hallucinations and sleep disturbance

## 2018-03-16 DIAGNOSIS — E78.5 HYPERLIPIDEMIA, UNSPECIFIED HYPERLIPIDEMIA TYPE: Primary | ICD-10-CM

## 2018-03-16 RX ORDER — ATORVASTATIN CALCIUM 10 MG/1
10 TABLET, FILM COATED ORAL DAILY
Qty: 90 TABLET | Refills: 2 | Status: SHIPPED | OUTPATIENT
Start: 2018-03-16

## 2018-04-10 ENCOUNTER — OFFICE VISIT (OUTPATIENT)
Dept: FAMILY MEDICINE CLINIC | Facility: CLINIC | Age: 53
End: 2018-04-10
Payer: COMMERCIAL

## 2018-04-10 VITALS
BODY MASS INDEX: 31.61 KG/M2 | DIASTOLIC BLOOD PRESSURE: 74 MMHG | HEART RATE: 74 BPM | RESPIRATION RATE: 16 BRPM | SYSTOLIC BLOOD PRESSURE: 130 MMHG | WEIGHT: 208.6 LBS | HEIGHT: 68 IN

## 2018-04-10 DIAGNOSIS — H10.33 ACUTE CONJUNCTIVITIS OF BOTH EYES, UNSPECIFIED ACUTE CONJUNCTIVITIS TYPE: Primary | ICD-10-CM

## 2018-04-10 PROCEDURE — 99214 OFFICE O/P EST MOD 30 MIN: CPT | Performed by: FAMILY MEDICINE

## 2018-04-10 RX ORDER — OFLOXACIN 3 MG/ML
1 SOLUTION/ DROPS OPHTHALMIC 4 TIMES DAILY
Qty: 5 ML | Refills: 0 | Status: SHIPPED | OUTPATIENT
Start: 2018-04-10 | End: 2018-04-15

## 2018-04-16 ENCOUNTER — OFFICE VISIT (OUTPATIENT)
Dept: FAMILY MEDICINE CLINIC | Facility: CLINIC | Age: 53
End: 2018-04-16
Payer: COMMERCIAL

## 2018-04-16 VITALS
TEMPERATURE: 99.3 F | HEART RATE: 72 BPM | SYSTOLIC BLOOD PRESSURE: 118 MMHG | WEIGHT: 209 LBS | DIASTOLIC BLOOD PRESSURE: 82 MMHG | BODY MASS INDEX: 31.67 KG/M2 | HEIGHT: 68 IN

## 2018-04-16 DIAGNOSIS — J01.00 ACUTE MAXILLARY SINUSITIS, RECURRENCE NOT SPECIFIED: Primary | ICD-10-CM

## 2018-04-16 PROCEDURE — 99213 OFFICE O/P EST LOW 20 MIN: CPT | Performed by: FAMILY MEDICINE

## 2018-04-16 RX ORDER — AMOXICILLIN AND CLAVULANATE POTASSIUM 875; 125 MG/1; MG/1
1 TABLET, FILM COATED ORAL EVERY 12 HOURS SCHEDULED
Qty: 20 TABLET | Refills: 0 | Status: SHIPPED | OUTPATIENT
Start: 2018-04-16 | End: 2018-04-26

## 2018-04-16 RX ORDER — CLOBETASOL PROPIONATE 0.5 MG/G
AEROSOL, FOAM TOPICAL AS NEEDED
COMMUNITY
Start: 2018-04-10

## 2018-04-16 NOTE — LETTER
April 16, 2018     Patient: Jenna Baxter   YOB: 1965   Date of Visit: 4/16/2018       To Whom it May Concern:    Jenna Baxter is under my professional care  She was seen in my office on 4/16/2018  She may return to work on 4/18/18  If you have any questions or concerns, please don't hesitate to call           Sincerely,          Dami Ely MD        CC: No Recipients

## 2018-04-16 NOTE — PROGRESS NOTES
Assessment/Plan:    No problem-specific Assessment & Plan notes found for this encounter  There are no diagnoses linked to this encounter  Subjective:      Patient ID: Colin Mas is a 47 y o  female  URI    This is a new problem  The current episode started in the past 7 days  The problem has been gradually worsening  The maximum temperature recorded prior to her arrival was 100 4 - 100 9 F  The fever has been present for 1 to 2 days  Associated symptoms include congestion, coughing, ear pain, headaches, joint pain, nausea, rhinorrhea, sinus pain and a sore throat  Pertinent negatives include no chest pain or vomiting  Associated symptoms comments: Mucus is dark yellow  She has tried increased fluids, sleep, decongestant, antihistamine and acetaminophen for the symptoms  The treatment provided no relief  The following portions of the patient's history were reviewed and updated as appropriate: allergies, current medications, past family history, past medical history, past social history, past surgical history and problem list     Review of Systems   Constitutional: Positive for activity change, appetite change, chills and fever  HENT: Positive for congestion, ear pain, rhinorrhea, sinus pain and sore throat  Respiratory: Positive for cough  Cardiovascular: Negative for chest pain  Gastrointestinal: Positive for nausea  Negative for vomiting  Musculoskeletal: Positive for joint pain  Neurological: Positive for headaches  Hematological: Negative for adenopathy  Objective:      /82   Pulse 72   Temp 99 3 °F (37 4 °C)   Ht 5' 8" (1 727 m)   Wt 94 8 kg (209 lb)   LMP 03/30/2018 (Approximate)   BMI 31 78 kg/m²          Physical Exam   Constitutional: She appears well-developed and well-nourished  HENT:   Right Ear: External ear normal    Left Ear: External ear normal    Nose: Mucosal edema and rhinorrhea present  Right sinus exhibits maxillary sinus tenderness  Left sinus exhibits maxillary sinus tenderness  Mouth/Throat: Oropharynx is clear and moist  No oropharyngeal exudate  Neck: No thyromegaly present  Cardiovascular: Normal rate, regular rhythm and normal heart sounds  Pulmonary/Chest: Breath sounds normal    Musculoskeletal: She exhibits no edema  Lymphadenopathy:     She has no cervical adenopathy

## 2018-04-22 PROBLEM — H10.33 ACUTE CONJUNCTIVITIS OF BOTH EYES: Status: ACTIVE | Noted: 2018-04-22

## 2018-04-23 NOTE — PROGRESS NOTES
Diagnoses and all orders for this visit:    Acute conjunctivitis of both eyes, unspecified acute conjunctivitis type  -     ofloxacin (OCUFLOX) 0 3 % ophthalmic solution; Administer 1 drop to both eyes 4 (four) times a day for 5 days          Subjective:      Patient ID: Sabino Taylor is a 47 y o  female  Here for eval of lt eye discomfort      Eye Pain    Associated symptoms include eye redness  The following portions of the patient's history were reviewed and updated as appropriate: allergies, current medications, past family history, past medical history, past social history, past surgical history and problem list     Review of Systems   Constitutional: Negative  HENT: Negative  Eyes: Positive for pain and redness  Respiratory: Negative  Cardiovascular: Negative  Gastrointestinal: Negative  Endocrine: Negative  Genitourinary: Negative  Musculoskeletal: Negative  Skin: Negative  Allergic/Immunologic: Negative  Neurological: Negative  Hematological: Negative  Psychiatric/Behavioral: Negative  Objective:      /74 (BP Location: Left arm, Patient Position: Sitting, Cuff Size: Standard)   Pulse 74   Resp 16   Ht 5' 8" (1 727 m)   Wt 94 6 kg (208 lb 9 6 oz)   LMP 03/30/2018 (Approximate)   BMI 31 72 kg/m²          Physical Exam   Constitutional: She is oriented to person, place, and time  She appears well-developed and well-nourished  HENT:   Head: Normocephalic  Eyes: EOM are normal  Pupils are equal, round, and reactive to light  Mild lt conjunctival injection   Neck: No thyromegaly present  Cardiovascular: Normal rate and regular rhythm  No murmur heard  Pulmonary/Chest: Effort normal and breath sounds normal  No respiratory distress  She has no wheezes  She has no rales  Abdominal: She exhibits no distension  Musculoskeletal: She exhibits no edema  Lymphadenopathy:     She has no cervical adenopathy     Neurological: She is alert and oriented to person, place, and time  Skin: Skin is warm  Psychiatric: She has a normal mood and affect   Her behavior is normal

## 2018-07-02 ENCOUNTER — APPOINTMENT (OUTPATIENT)
Dept: LAB | Facility: CLINIC | Age: 53
End: 2018-07-02
Payer: COMMERCIAL

## 2018-07-02 ENCOUNTER — TRANSCRIBE ORDERS (OUTPATIENT)
Dept: LAB | Facility: CLINIC | Age: 53
End: 2018-07-02

## 2018-07-02 DIAGNOSIS — Z00.8 HEALTH EXAMINATION IN POPULATION SURVEY: Primary | ICD-10-CM

## 2018-07-02 DIAGNOSIS — G43.709 CHRONIC MIGRAINE WITHOUT AURA WITHOUT STATUS MIGRAINOSUS, NOT INTRACTABLE: ICD-10-CM

## 2018-07-02 DIAGNOSIS — R79.89 LOW VITAMIN D LEVEL: ICD-10-CM

## 2018-07-02 DIAGNOSIS — Z00.8 HEALTH EXAMINATION IN POPULATION SURVEY: ICD-10-CM

## 2018-07-02 DIAGNOSIS — E55.9 VITAMIN D DEFICIENCY: Primary | ICD-10-CM

## 2018-07-02 LAB
25(OH)D3 SERPL-MCNC: 23.8 NG/ML (ref 30–100)
CHOLEST SERPL-MCNC: 210 MG/DL (ref 50–200)
EST. AVERAGE GLUCOSE BLD GHB EST-MCNC: 117 MG/DL
HBA1C MFR BLD: 5.7 % (ref 4.2–6.3)
HDLC SERPL-MCNC: 53 MG/DL (ref 40–60)
LDLC SERPL CALC-MCNC: 132 MG/DL (ref 0–100)
NONHDLC SERPL-MCNC: 157 MG/DL
TRIGL SERPL-MCNC: 123 MG/DL
TSH SERPL DL<=0.05 MIU/L-ACNC: 1.1 UIU/ML (ref 0.36–3.74)
VIT B12 SERPL-MCNC: 426 PG/ML (ref 100–900)

## 2018-07-02 PROCEDURE — 84443 ASSAY THYROID STIM HORMONE: CPT

## 2018-07-02 PROCEDURE — 83036 HEMOGLOBIN GLYCOSYLATED A1C: CPT

## 2018-07-02 PROCEDURE — 82306 VITAMIN D 25 HYDROXY: CPT

## 2018-07-02 PROCEDURE — 82607 VITAMIN B-12: CPT

## 2018-07-02 PROCEDURE — 80061 LIPID PANEL: CPT

## 2018-07-02 PROCEDURE — 36415 COLL VENOUS BLD VENIPUNCTURE: CPT

## 2018-07-02 RX ORDER — ERGOCALCIFEROL 1.25 MG/1
50000 CAPSULE ORAL WEEKLY
Qty: 8 CAPSULE | Refills: 0 | Status: SHIPPED | OUTPATIENT
Start: 2018-07-02 | End: 2018-11-07

## 2018-07-23 ENCOUNTER — OFFICE VISIT (OUTPATIENT)
Dept: FAMILY MEDICINE CLINIC | Facility: CLINIC | Age: 53
End: 2018-07-23
Payer: COMMERCIAL

## 2018-07-23 VITALS
BODY MASS INDEX: 31.37 KG/M2 | WEIGHT: 207 LBS | RESPIRATION RATE: 18 BRPM | HEART RATE: 80 BPM | SYSTOLIC BLOOD PRESSURE: 140 MMHG | DIASTOLIC BLOOD PRESSURE: 90 MMHG | TEMPERATURE: 98.5 F | HEIGHT: 68 IN

## 2018-07-23 DIAGNOSIS — I10 ESSENTIAL HYPERTENSION: Primary | ICD-10-CM

## 2018-07-23 PROCEDURE — 99213 OFFICE O/P EST LOW 20 MIN: CPT | Performed by: NURSE PRACTITIONER

## 2018-07-23 RX ORDER — LISINOPRIL 5 MG/1
5 TABLET ORAL DAILY
Qty: 30 TABLET | Refills: 0 | Status: SHIPPED | OUTPATIENT
Start: 2018-07-23 | End: 2018-08-06 | Stop reason: SDUPTHER

## 2018-07-23 NOTE — PATIENT INSTRUCTIONS
Chronic Hypertension, Ambulatory Care   GENERAL INFORMATION:   Chronic hypertension  is a long-term condition in which your blood pressure (BP) is higher than normal  Your BP is the force of your blood moving against the walls of your arteries  Hypertension is a BP of 140/90 or higher  Common symptoms include the following:   · Headache     · Blurred vision    · Chest pain     · Dizziness or weakness     · Trouble breathing     · Nosebleeds  Seek immediate care for the following symptoms:   · Severe headache or vision loss    · Weakness in an arm or leg    · Confusion or difficulty speaking    · Discomfort in your chest that feels like squeezing, pressure, fullness, or pain    · Suddenly feeling lightheaded or trouble breathing    · Pain or discomfort in your back, neck, jaw, stomach, or arm  Treatment for chronic hypertension  may include medicine to lower your BP  You may also need to make lifestyle changes  Take your medicine exactly as directed  Manage chronic hypertension:   · Take your BP at home  Sit and rest for 5 minutes before you take your BP  Extend your arm and support it on a flat surface  Your arm should be at the same level as your heart  Follow the directions that came with your BP monitor  If possible, take at least 2 BP readings each time  Take your BP at least twice a day at the same times each day, such as morning and evening  Keep a log of your BP readings and bring it to your follow-up visits  · Eat less sodium (salt)  Do not add sodium to your food  Limit foods that are high in sodium, such as canned foods, potato chips, and cold cuts  Your healthcare provider may suggest that you follow the [address] [place] [address] Street  The plan is low in sodium, unhealthy fats, and total fat  It is high in potassium, calcium, and fiber  · Exercise regularly  Exercise at least 30 minutes per day, on most days of the week  This will help decrease your BP   Ask your healthcare provider about the best exercise plan for you  · Limit alcohol  Women should limit alcohol to 1 drink a day  Men should limit alcohol to 2 drinks a day  A drink of alcohol is 12 ounces of beer, 5 ounces of wine, or 1½ ounces of liquor  · Do not smoke  If you smoke, it is never too late to quit  Smoking can increase your BP  Smoking also worsens other health conditions you may have that can increase your risk for hypertension  Ask your healthcare provider for information if you need help quitting  Follow up with your healthcare provider as directed: You will need to return to have your BP checked and to have other lab tests done  Write down your questions so you remember to ask them during your visits  CARE AGREEMENT:   You have the right to help plan your care  Learn about your health condition and how it may be treated  Discuss treatment options with your caregivers to decide what care you want to receive  You always have the right to refuse treatment  The above information is an  only  It is not intended as medical advice for individual conditions or treatments  Talk to your doctor, nurse or pharmacist before following any medical regimen to see if it is safe and effective for you  © 2014 [address] is for End User's use only and may not be sold, redistributed or otherwise used for commercial purposes  All illustrations and images included in CareNotes® are the copyrighted property of [employer]  or [employer]  Heart Healthy Diet   WHAT YOU NEED TO KNOW:   A heart healthy diet is an eating plan low in total fat, unhealthy fats, and sodium (salt)  A heart healthy diet helps decrease your risk for heart disease and stroke  Limit the amount of fat you eat to 25% to 35% of your total daily calories  Limit sodium to less than 2,300 mg each day  DISCHARGE INSTRUCTIONS:   Healthy fats:  Healthy fats can help improve cholesterol levels   The risk for heart disease is decreased when cholesterol levels are normal  Choose healthy fats, such as the following:  · Unsaturated fat  is found in foods such as soybean, canola, olive, corn, and safflower oils  It is also found in soft tub margarine that is made with liquid vegetable oil  · Omega-3 fat  is found in certain fish, such as salmon, tuna, and trout, and in walnuts and flaxseed  Unhealthy fats:  Unhealthy fats can cause unhealthy cholesterol levels in your blood and increase your risk of heart disease  Limit unhealthy fats, such as the following:  · Cholesterol  is found in animal foods, such as eggs and lobster, and in dairy products made from whole milk  Limit cholesterol to less than 300 milligrams (mg) each day  You may need to limit cholesterol to 200 mg each day if you have heart disease  · Saturated fat  is found in meats, such as sharpe and hamburger  It is also found in chicken or turkey skin, whole milk, and butter  Limit saturated fat to less than 7% of your total daily calories  Limit saturated fat to less than 6% if you have heart disease or are at increased risk for it  · Trans fat  is found in packaged foods, such as potato chips and cookies  It is also in hard margarine, some fried foods, and shortening  Avoid trans fats as much as possible    Heart healthy foods and drinks to include:  Ask your dietitian or healthcare provider how many servings to have from each of the following food groups:  · Grains:      ¨ Whole-wheat breads, cereals, and pastas, and brown rice    ¨ Low-fat, low-sodium crackers and chips    · Vegetables:      ¨ Broccoli, green beans, green peas, and spinach    ¨ Collards, kale, and lima beans    ¨ Carrots, sweet potatoes, tomatoes, and peppers    ¨ Canned vegetables with no salt added    · Fruits:      ¨ Bananas, peaches, pears, and pineapple    ¨ Grapes, raisins, and dates    ¨ Oranges, tangerines, grapefruit, orange juice, and grapefruit juice    ¨ Apricots, mangoes, melons, and papaya    ¨ Raspberries and strawberries    ¨ Canned fruit with no added sugar    · Low-fat dairy products:      ¨ Nonfat (skim) milk, 1% milk, and low-fat almond, cashew, or soy milks fortified with calcium    ¨ Low-fat cheese, regular or frozen yogurt, and cottage cheese    · Meats and proteins , such as lean cuts of beef and pork (loin, leg, round), skinless chicken and turkey, legumes, soy products, egg whites, and nuts  Foods and drinks to limit or avoid:  Ask your dietitian or healthcare provider about these and other foods that are high in unhealthy fat, sodium, and sugar:  · Snack or packaged foods , such as frozen dinners, cookies, macaroni and cheese, and cereals with more than 300 mg of sodium per serving    · Canned or dry mixes  for cakes, soups, sauces, or gravies    · Vegetables with added sodium , such as instant potatoes, vegetables with added sauces, or regular canned vegetables    · Other foods high in sodium , such as ketchup, barbecue sauce, salad dressing, pickles, olives, soy sauce, and miso    · High-fat dairy foods  such as whole or 2% milk, cream cheese, or sour cream, and cheeses     · High-fat protein foods  such as high-fat cuts of beef (T-bone steaks, ribs), chicken or turkey with skin, and organ meats, such as liver    · Cured or smoked meats , such as hot dogs, sharpe, and sausage    · Unhealthy fats and oils , such as butter, stick margarine, shortening, and cooking oils such as coconut or palm oil    · Food and drinks high in sugar , such as soft drinks (soda), sports drinks, sweetened tea, candy, cake, cookies, pies, and doughnuts  Other diet guidelines to follow:   · Eat more foods containing omega-3 fats  Eat fish high in omega-3 fats at least 2 times a week  · Limit alcohol  Too much alcohol can damage your heart and raise your blood pressure  Women should limit alcohol to 1 drink a day  Men should limit alcohol to 2 drinks a day   A drink of alcohol is 12 ounces of beer, 5 ounces of wine, or 1½ ounces of liquor  · Choose low-sodium foods  High-sodium foods can lead to high blood pressure  Add little or no salt to food you prepare  Use herbs and spices in place of salt  · Eat more fiber  to help lower cholesterol levels  Eat at least 5 servings of fruits and vegetables each day  Eat 3 ounces of whole-grain foods each day  Legumes (beans) are also a good source of fiber  Lifestyle guidelines:   · Do not smoke  Nicotine and other chemicals in cigarettes and cigars can cause lung and heart damage  Ask your healthcare provider for information if you currently smoke and need help to quit  E-cigarettes or smokeless tobacco still contain nicotine  Talk to your healthcare provider before you use these products  · Exercise regularly  to help you maintain a healthy weight and improve your blood pressure and cholesterol levels  Ask your healthcare provider about the best exercise plan for you  Do not start an exercise program without asking your healthcare provider  Follow up with your healthcare provider as directed:  Write down your questions so you remember to ask them during your visits  © 2017 [address] [facility] Information is for End User's use only and may not be sold, redistributed or otherwise used for commercial purposes  All illustrations and images included in CareNotes® are the copyrighted property of [employer]  or N[employer]  The above information is an  only  It is not intended as medical advice for individual conditions or treatments  Talk to your doctor, nurse or pharmacist before following any medical regimen to see if it is safe and effective for you

## 2018-07-23 NOTE — PROGRESS NOTES
Chief Complaint   Patient presents with    Ear Discomfort     right ear discomfort, "ear throbbing"       Assessment/Plan:     Diagnoses and all orders for this visit:    Essential hypertension  -     lisinopril (ZESTRIL) 5 mg tablet; Take 1 tablet (5 mg total) by mouth daily          Subjective:      Patient ID: Simón Young is a 47 y o  female  Patient states she was at the kiosk for our employee wellness in which is read high  She states she noticed her blood pressure has been creeping up  She states for about 3 weeks she had throbbing pulsing sensation in her right ear  At night she feels this more, and also in the quiet  She also states she had some difficulty with hearing in the right side  The following portions of the patient's history were reviewed and updated as appropriate: allergies, current medications, past family history, past medical history, past social history, past surgical history and problem list     Review of Systems   Constitutional: Negative for diaphoresis, fatigue and fever  HENT: Positive for tinnitus  Respiratory: Negative for chest tightness and shortness of breath  Cardiovascular: Negative for chest pain, palpitations and leg swelling  Neurological: Positive for headaches  Negative for dizziness and light-headedness  Objective:      /90 (BP Location: Left arm, Patient Position: Sitting, Cuff Size: Adult)   Pulse 80   Temp 98 5 °F (36 9 °C) (Temporal)   Resp 18   Ht 5' 8" (1 727 m)   Wt 93 9 kg (207 lb)   Breastfeeding? No   BMI 31 47 kg/m²          Physical Exam   Constitutional: She appears well-developed and well-nourished  HENT:   Head: Normocephalic and atraumatic  Right Ear: Tympanic membrane normal    Left Ear: Tympanic membrane normal    Mouth/Throat: Uvula is midline, oropharynx is clear and moist and mucous membranes are normal    Neck: No JVD present  Carotid bruit is not present  No thyromegaly present     Cardiovascular: Normal rate, regular rhythm and normal heart sounds  No murmur heard  No lower extremity edema    Pulmonary/Chest: Effort normal and breath sounds normal  No respiratory distress  Skin: Skin is warm, dry and intact

## 2018-08-06 ENCOUNTER — OFFICE VISIT (OUTPATIENT)
Dept: FAMILY MEDICINE CLINIC | Facility: CLINIC | Age: 53
End: 2018-08-06
Payer: COMMERCIAL

## 2018-08-06 VITALS
HEIGHT: 68 IN | WEIGHT: 203 LBS | TEMPERATURE: 97.2 F | SYSTOLIC BLOOD PRESSURE: 122 MMHG | DIASTOLIC BLOOD PRESSURE: 80 MMHG | BODY MASS INDEX: 30.77 KG/M2 | HEART RATE: 90 BPM

## 2018-08-06 DIAGNOSIS — I10 ESSENTIAL HYPERTENSION: Primary | ICD-10-CM

## 2018-08-06 PROCEDURE — 3074F SYST BP LT 130 MM HG: CPT | Performed by: NURSE PRACTITIONER

## 2018-08-06 PROCEDURE — 99213 OFFICE O/P EST LOW 20 MIN: CPT | Performed by: NURSE PRACTITIONER

## 2018-08-06 PROCEDURE — 3079F DIAST BP 80-89 MM HG: CPT | Performed by: NURSE PRACTITIONER

## 2018-08-06 RX ORDER — LISINOPRIL 5 MG/1
5 TABLET ORAL DAILY
Qty: 90 TABLET | Refills: 1 | Status: SHIPPED | OUTPATIENT
Start: 2018-08-06

## 2018-08-06 NOTE — PROGRESS NOTES
Chief Complaint   Patient presents with    Hypertension     46 y/o female is here to F/U on HTN after starting lisinopril 5mg  Assessment/Plan:    Essential hypertension  Bp starting to trend down  Patient on lisinopril 5mg  We will continue with this  Recheck in 4 months  Diagnoses and all orders for this visit:    Essential hypertension  -     lisinopril (ZESTRIL) 5 mg tablet; Take 1 tablet (5 mg total) by mouth daily          Subjective:      Patient ID: Baldo Cummings is a 47 y o  female  Hypertension   This is a new problem  The current episode started 1 to 4 weeks ago  The problem has been gradually improving since onset  The problem is controlled  Pertinent negatives include no anxiety, blurred vision, chest pain, headaches, orthopnea, palpitations, peripheral edema or shortness of breath  There are no associated agents to hypertension  Risk factors for coronary artery disease include stress  Past treatments include ACE inhibitors  The current treatment provides significant improvement  There are no compliance problems  The following portions of the patient's history were reviewed and updated as appropriate: allergies, current medications, past family history, past medical history, past social history, past surgical history and problem list     Review of Systems   Constitutional: Negative for fatigue  HENT: Positive for tinnitus  Negative for trouble swallowing  Eyes: Negative for blurred vision and visual disturbance  Respiratory: Negative for chest tightness and shortness of breath  Cardiovascular: Negative for chest pain, palpitations, orthopnea and leg swelling  Neurological: Negative for dizziness, light-headedness and headaches  Objective:      /80   Pulse 90   Temp (!) 97 2 °F (36 2 °C)   Ht 5' 8" (1 727 m)   Wt 92 1 kg (203 lb)   BMI 30 87 kg/m²          Physical Exam   Constitutional: She is oriented to person, place, and time   She appears well-developed and well-nourished  HENT:   Head: Normocephalic and atraumatic  Right Ear: Tympanic membrane normal    Left Ear: Tympanic membrane normal    Neck: No JVD present  Cardiovascular: Normal rate, regular rhythm and normal heart sounds  No murmur heard  Pulmonary/Chest: Effort normal and breath sounds normal    Lymphadenopathy:     She has no cervical adenopathy  Neurological: She is alert and oriented to person, place, and time  Psychiatric: She has a normal mood and affect  Thought content normal    Nursing note and vitals reviewed

## 2018-08-06 NOTE — ASSESSMENT & PLAN NOTE
Bp starting to trend down  Patient on lisinopril 5mg  We will continue with this  Recheck in 4 months

## 2018-09-04 ENCOUNTER — OFFICE VISIT (OUTPATIENT)
Dept: FAMILY MEDICINE CLINIC | Facility: CLINIC | Age: 53
End: 2018-09-04
Payer: COMMERCIAL

## 2018-09-04 VITALS
WEIGHT: 203.38 LBS | HEIGHT: 67 IN | RESPIRATION RATE: 14 BRPM | HEART RATE: 100 BPM | BODY MASS INDEX: 31.92 KG/M2 | SYSTOLIC BLOOD PRESSURE: 120 MMHG | DIASTOLIC BLOOD PRESSURE: 82 MMHG | TEMPERATURE: 98 F

## 2018-09-04 DIAGNOSIS — R42 VERTIGO: Primary | ICD-10-CM

## 2018-09-04 PROCEDURE — 99213 OFFICE O/P EST LOW 20 MIN: CPT | Performed by: NURSE PRACTITIONER

## 2018-09-04 RX ORDER — MECLIZINE HYDROCHLORIDE 25 MG/1
25 TABLET ORAL 3 TIMES DAILY PRN
Qty: 30 TABLET | Refills: 0 | Status: SHIPPED | OUTPATIENT
Start: 2018-09-04 | End: 2018-11-07

## 2018-09-04 NOTE — PATIENT INSTRUCTIONS
Vertigo   AMBULATORY CARE:   Vertigo  is a condition that causes you to feel dizzy  You may feel that you or everything around you is moving or spinning  You may also feel like you are being pulled down or toward your side  Common symptoms that may happen with vertigo:   · Nausea or vomiting    · Trouble with your balance    · Sensitivity to light or sound    · Weakness, slurred speech, problems seeing or moving, or increased sleepiness    · Facial weakness and headache    · Hearing loss, ear fullness or pain, or hearing ringing sounds    · Fast, uncontrolled movement of your eyes  Seek care immediately if:   · You have a headache and a stiff neck  · You have shaking chills and a fever  · You vomit over and over with no relief  · You have blood, pus, or fluid coming out of your ears  · You are confused  Contact your healthcare provider if:   · Your symptoms do not get better with treatment  · You have questions about your condition or care  Treatment for vertigo  may include resting in bed or avoid certain activities for a time  You may need to decrease or stop taking medicines that are causing your vertigo  Medicines may also be prescribed to help relieve your symptoms  Manage your symptoms:   · Do not drive , walk without help, or operate heavy machinery when you are dizzy  · Move slowly  when you move from one position to another position  Get up slowly from sitting or lying down  Sit or lie down right away if you feel dizzy  · Drink plenty of liquids  Liquids help prevent dehydration  Ask how much liquid to drink each day and which liquids are best for you  · Vestibular and balance rehabilitation therapy (VBRT)  is used to teach you exercises to improve your balance and strength  These exercises may help decrease your vertigo and improve your balance  Ask for more information about this therapy    Follow up with your healthcare provider as directed:  Write down your questions so you remember to ask them during your visits  © 2017 [employer] [employer] Information is for End User's use only and may not be sold, redistributed or otherwise used for commercial purposes  All illustrations and images included in CareNotes® are the copyrighted property of A D A M , Inc  or Narc[employer]  The above information is an  only  It is not intended as medical advice for individual conditions or treatments  Talk to your doctor, nurse or pharmacist before following any medical regimen to see if it is safe and effective for you

## 2018-09-04 NOTE — PROGRESS NOTES
Chief Complaint   Patient presents with    Nausea    Dizziness    Headache       Assessment/Plan:     Diagnoses and all orders for this visit:    Vertigo  -     meclizine (ANTIVERT) 25 mg tablet; Take 1 tablet (25 mg total) by mouth 3 (three) times a day as needed for dizziness          Subjective:      Patient ID: Baldo Cummings is a 47 y o  female  Dizziness   This is a new problem  The current episode started yesterday  The problem occurs constantly  The problem has been unchanged  Associated symptoms include headaches and nausea  Pertinent negatives include no abdominal pain, anorexia, chest pain, chills, congestion, coughing, diaphoresis, fatigue, fever, sore throat, swollen glands, urinary symptoms, visual change or vomiting  The symptoms are aggravated by walking and standing  She has tried rest for the symptoms  The treatment provided no relief  The following portions of the patient's history were reviewed and updated as appropriate: allergies, current medications, past family history, past medical history, past social history, past surgical history and problem list     Review of Systems   Constitutional: Negative for chills, diaphoresis, fatigue and fever  HENT: Negative for congestion and sore throat  Eyes: Negative for visual disturbance  Respiratory: Negative for cough  Cardiovascular: Negative for chest pain  Gastrointestinal: Positive for nausea  Negative for abdominal pain, anorexia and vomiting  Neurological: Positive for dizziness and headaches  Objective:      /82 (BP Location: Left arm, Patient Position: Sitting, Cuff Size: Adult)   Pulse 100   Temp 98 °F (36 7 °C) (Temporal)   Resp 14   Ht 5' 7 05" (1 703 m)   Wt 92 3 kg (203 lb 6 oz)   LMP 08/28/2018 (Approximate)   BMI 31 81 kg/m²          Physical Exam   Constitutional: She is oriented to person, place, and time  She appears well-developed and well-nourished     HENT:   Head: Normocephalic and atraumatic  Right Ear: Tympanic membrane normal    Left Ear: Tympanic membrane normal    Nose: Nose normal    Mouth/Throat: Uvula is midline, oropharynx is clear and moist and mucous membranes are normal    Cardiovascular: Normal rate, regular rhythm, S1 normal, S2 normal and normal heart sounds  No murmur heard  Pulmonary/Chest: Effort normal and breath sounds normal    Neurological: She is alert and oriented to person, place, and time  No cranial nerve deficit  She displays a negative Romberg sign  Epley maneuver positive L>R    Nursing note and vitals reviewed

## 2018-09-04 NOTE — LETTER
September 4, 2018     Patient: Chloe Chandra   YOB: 1965   Date of Visit: 9/4/2018       To Whom it May Concern:    Chloe Chandra is under my professional care  She was seen in my office on 9/4/2018  She may return to work on 9/6/2018  If you have any questions or concerns, please don't hesitate to call           Sincerely,          ALEXIS Malagon        CC: No Recipients

## 2018-09-05 ENCOUNTER — OFFICE VISIT (OUTPATIENT)
Dept: NEUROLOGY | Facility: CLINIC | Age: 53
End: 2018-09-05
Payer: COMMERCIAL

## 2018-09-05 VITALS
HEIGHT: 67 IN | BODY MASS INDEX: 31.86 KG/M2 | DIASTOLIC BLOOD PRESSURE: 88 MMHG | SYSTOLIC BLOOD PRESSURE: 120 MMHG | WEIGHT: 203 LBS | HEART RATE: 96 BPM

## 2018-09-05 DIAGNOSIS — R42 VERTIGO: Primary | ICD-10-CM

## 2018-09-05 PROCEDURE — 99214 OFFICE O/P EST MOD 30 MIN: CPT | Performed by: PHYSICIAN ASSISTANT

## 2018-09-05 NOTE — PATIENT INSTRUCTIONS
Vertigo  Pt reports and has exam findings consistent with peripheral vertigo  MRI and MRA of the brain will be ordered to rule out a central cause of her symptoms  If the testing is negative she will be sent for vestibular rehab  She will try to do some vestibular exercises at home  She will follow-up in 2 months

## 2018-09-05 NOTE — PROGRESS NOTES
Patient ID: Chloe Chandra is a 47 y o  female  Assessment/Plan:    Vertigo  Pt reports and has exam findings consistent with peripheral vertigo  MRI and MRA of the brain will be ordered to rule out a central cause of her symptoms  If the testing is negative she will be sent for vestibular rehab  She will try to do some vestibular exercises at home  She will follow-up in 2 months  Problem List Items Addressed This Visit     Vertigo - Primary     Pt reports and has exam findings consistent with peripheral vertigo  MRI and MRA of the brain will be ordered to rule out a central cause of her symptoms  If the testing is negative she will be sent for vestibular rehab  She will try to do some vestibular exercises at home  She will follow-up in 2 months  Relevant Orders    MRI brain without contrast    MRI brain wo mra head and neck wo             Subjective:    HPI    Pt reports that she has had vertigo for the past 3 days  She woke up 9/3/18 and she felt dizzy  She describes the dizziness as lightheadedness but also with an element of the room moving when she turns her head  Not moving makes it better  Turning her head quickly makes it worse  She had tinnitus a few months ago but not hearing loss  She has missed work since 9/3/18 due to these symptoms  The vertigo is associated with nausea  She does have a decreased appetite when nauseated  She also has a mild headache since it started  She will take Tylenol which helps with the headache  She has never had any previous episodes of dizziness  She has not had any medication changes  She did see her PCP yesterday and meclizine was prescribed  She has not taken it due to concerns of fatigue      The following portions of the patient's history were reviewed and updated as appropriate: allergies, current medications, past family history, past medical history, past social history, past surgical history and problem list          Objective:    Blood pressure 120/88, pulse 96, height 5' 7 05" (1 703 m), weight 92 1 kg (203 lb), last menstrual period 08/28/2018, not currently breastfeeding  Physical Exam   Eyes: EOM are normal  Pupils are equal, round, and reactive to light  Neurological: She has normal strength and normal reflexes  Gait normal    Psychiatric: Her speech is normal    Vitals reviewed  Neurological Exam    Mental Status  The patient is alert and oriented to person, place, time, and situation  Her recent and remote memory are normal  She has no visuospatial neglect  Her speech is normal  Her language is fluent with no aphasia  She has normal attention span and concentration  She follows multi-step commands  She has a normal fund of knowledge  [place] Hallpike positive  Cranial Nerves    CN II: The patient's visual acuity and visual fields are normal   CN III, IV, VI: The patient's pupils are equally round and reactive to light and ocular movements are normal   CN V: The patient has normal facial sensation  CN VII:  The patient has symmetric facial movement  CN VIII:  The patient's hearing is normal   CN IX, X: The patient has symmetric palate movement and normal gag reflex  CN XI: The patient's shoulder shrug strength is normal   CN XII: The patient's tongue is midline without atrophy or fasciculations  Motor   Her strength is 5/5 throughout all four extremities  Sensory  The patient's sensation is normal in all four extremities  Reflexes  Deep tendon reflexes are 2+ and symmetric in all four extremities with downgoing toes bilaterally  Gait and Coordination  The patient has normal gait and station  ROS:    Review of Systems   Constitutional: Positive for fatigue  HENT: Negative  Eyes: Negative  Respiratory: Negative  Cardiovascular: Negative  Gastrointestinal: Positive for nausea  Endocrine: Negative  Genitourinary: Negative  Musculoskeletal: Positive for neck pain  Skin: Negative  Allergic/Immunologic: Negative  Neurological: Positive for dizziness, weakness, light-headedness and headaches  Hematological: Negative  Psychiatric/Behavioral: Negative

## 2018-09-05 NOTE — ASSESSMENT & PLAN NOTE
Pt reports and has exam findings consistent with peripheral vertigo  MRI and MRA of the brain will be ordered to rule out a central cause of her symptoms  If the testing is negative she will be sent for vestibular rehab  She will try to do some vestibular exercises at home  She will follow-up in 2 months

## 2018-09-07 ENCOUNTER — HOSPITAL ENCOUNTER (OUTPATIENT)
Dept: RADIOLOGY | Facility: HOSPITAL | Age: 53
Discharge: HOME | End: 2018-09-07
Payer: COMMERCIAL

## 2018-09-07 DIAGNOSIS — R42 VERTIGO: ICD-10-CM

## 2018-09-07 PROCEDURE — 70544 MR ANGIOGRAPHY HEAD W/O DYE: CPT

## 2018-09-07 PROCEDURE — 70551 MRI BRAIN STEM W/O DYE: CPT

## 2018-09-07 PROCEDURE — 70547 MR ANGIOGRAPHY NECK W/O DYE: CPT

## 2018-09-10 ENCOUNTER — TELEPHONE (OUTPATIENT)
Dept: FAMILY MEDICINE CLINIC | Facility: CLINIC | Age: 53
End: 2018-09-10

## 2018-09-10 DIAGNOSIS — R42 VERTIGO: Primary | ICD-10-CM

## 2018-09-10 NOTE — TELEPHONE ENCOUNTER
----- Message from Fleurette Severin, MD sent at 9/10/2018 11:17 AM EDT -----  MRI non specific abnormalities, MRA nl-rec ov to naresh

## 2018-09-24 ENCOUNTER — EVALUATION (OUTPATIENT)
Dept: PHYSICAL THERAPY | Facility: CLINIC | Age: 53
End: 2018-09-24
Payer: COMMERCIAL

## 2018-09-24 DIAGNOSIS — R42 VERTIGO: ICD-10-CM

## 2018-09-24 PROCEDURE — G8979 MOBILITY GOAL STATUS: HCPCS | Performed by: PHYSICAL THERAPIST

## 2018-09-24 PROCEDURE — G8978 MOBILITY CURRENT STATUS: HCPCS | Performed by: PHYSICAL THERAPIST

## 2018-09-24 PROCEDURE — 97161 PT EVAL LOW COMPLEX 20 MIN: CPT | Performed by: PHYSICAL THERAPIST

## 2018-09-24 NOTE — PROGRESS NOTES
PT Evaluation     Today's date: 2018  Patient name: Yany Ortiz  : 1965  MRN: [id number]  Referring provider: Grace Mckeon PA-C  Dx:   Encounter Diagnosis     ICD-10-CM    1  Vertigo R42 Ambulatory referral to Physical Therapy                  Assessment    Assessment details: Pt is a 47year old female referred to OPPT with diagnosis of peripheral vertigo  Pt reported severe vertigo and dizziness 2-3 weeks ago but only has minor dizziness now and reported it has significantly improved  Sxs report appears to be consistent with BPPV  Pt presents with negative [place] Hallpike bilaterally with no nystagmus observed, however pt did have minor report of dizziness during [employer] and head turns which lasted for a few seconds  Pt demo no significant findings noted during oculomotor exam and head thrust, and did not have any balance deficits per DGI and MCTSIB  No skilled PT indicated at this time since it appears BPPV has resolved on it's own, however reviewed Negro-Daroff exercises with pt if dizziness persists with positional changes  Plan  Treatment plan discussed with: patient        Subjective Evaluation    History of Present Illness  Mechanism of injury: Pt reports that she had vertigo for about 2-3 weeks now, started around labor day  Still getting dizzy especially in the car when going over bumps, going up/down stairs, and getting up/down quickly in chair  Feels better when she is staying still  Pt denies any fall or LOB, usually stops when she get dizzy  Pt does have history of migraine  Overall, feels like it is much better than previously       States she had MRI and MRA with no significant findings and was diagnosed with peripheral vertigo by her neurologist    Pain  No pain reported    Social Support    Employment status: working ( at [employer] (a lot of sitting) )    Diagnostic Tests    FCE comments:  MRI brain: Scattered white matter disease on T2 and FLAIR imaging, somewhat pronounced for a patient of this age suggesting precocious chronic microangiopathy  Correlate for history of hypertension, diabetes, hyperlipidemia  No acute ischemia, mass or hemorrhage    9/7 MRA carotid: Unremarkable MR angiogram of the cervical vasculature  Incidentally noted aberrant right subclavian artery        Objective  PT/OT Neuro Exam  Neurologic Exam         Dysequilibrium: Yes  Lightheadedness: Yes  Vertigo: Yes   Rocking or Swaying: No         Oscillopsia: Yes  Diplopia: No  Motion sickness: Yes  Floating, Swimming, Disconnected: No    Exacerbation Factors:  Bending over: Yes  Turning Head: Yes   Rolling in bed: No  Walking: Yes  Looking up: Yes  Supine to/from sitting: Yes  Optokinetic movement: Yes  Walking in busy environment: Yes     Concurrent Complaints:  Tinnitus:No, 2 months ago had tinnitus due to HTN  Aural Fullness:No  Known hearing loss:No  Nausea, Vomiting: Yes, N only  Altered Vision: No  Poor Concentration: No  Memory Loss: No  Peripheral Neuropathy:No    Modified VBI: neg B     PHYSICAL FINDINGS:  Oculomotor ROM : WNL  Resting nystagmus: No  Gaze holding nystagmus No   Smooth pursuit Normal    Vertical Saccades:Normal  Horizontal Saccades:Normal  Convergence: Normal    Cover/Uncover/Crosscover Test: NT    Head thrust (room light): Normal    Dynamic Visual Acuity: adequate  Dynamic Head: 20/50   Static Head: 20/25       MCTSIB  30 eyes open firm surface   30 eyes closed form surface  30 eyes open foam surface  30 eyes closed foam surface    DGI: 24/24    DHI:   0-30 mild , 30-60  Positional testing  [place]-Hallpike: neg B, minor report of dizziness during [employer] but no nystagmus observed, duration only 3 sec  Roll Test: neg B          Precautions: none      Flowsheet Rows      Most Recent Value   PT/OT G-Codes   Current Score  60   Projected Score  84

## 2018-10-01 DIAGNOSIS — G43.709 CHRONIC MIGRAINE WITHOUT AURA WITHOUT STATUS MIGRAINOSUS, NOT INTRACTABLE: ICD-10-CM

## 2018-11-07 ENCOUNTER — OFFICE VISIT (OUTPATIENT)
Dept: NEUROLOGY | Facility: CLINIC | Age: 53
End: 2018-11-07
Payer: COMMERCIAL

## 2018-11-07 VITALS
BODY MASS INDEX: 30.92 KG/M2 | HEART RATE: 89 BPM | SYSTOLIC BLOOD PRESSURE: 142 MMHG | DIASTOLIC BLOOD PRESSURE: 86 MMHG | WEIGHT: 204 LBS | HEIGHT: 68 IN

## 2018-11-07 DIAGNOSIS — R42 VERTIGO: ICD-10-CM

## 2018-11-07 DIAGNOSIS — G43.709 CHRONIC MIGRAINE WITHOUT AURA WITHOUT STATUS MIGRAINOSUS, NOT INTRACTABLE: Primary | ICD-10-CM

## 2018-11-07 PROCEDURE — 96372 THER/PROPH/DIAG INJ SC/IM: CPT | Performed by: PHYSICIAN ASSISTANT

## 2018-11-07 PROCEDURE — 99214 OFFICE O/P EST MOD 30 MIN: CPT | Performed by: PHYSICIAN ASSISTANT

## 2018-11-07 RX ORDER — KETOROLAC TROMETHAMINE 30 MG/ML
60 INJECTION, SOLUTION INTRAMUSCULAR; INTRAVENOUS ONCE
Status: COMPLETED | OUTPATIENT
Start: 2018-11-07 | End: 2018-11-07

## 2018-11-07 RX ADMIN — KETOROLAC TROMETHAMINE 60 MG: 30 INJECTION, SOLUTION INTRAMUSCULAR; INTRAVENOUS at 14:44

## 2018-11-07 NOTE — PROGRESS NOTES
Patient ID: Marisa Dalton is a 46 y o  female  Assessment/Plan:    Chronic migraine without aura without status migrainosus, not intractable  Pt will continue Protriptyline 10mg daily  An injection of Toradol 60mg IM will be given today  If the headache is still present tomorrow morning she will take Decadron  She will call the office if the current headache continues to be a problem  She will follow-up in 4 months  Vertigo  Improved  She will continue with home exercises  Problem List Items Addressed This Visit     Chronic migraine without aura without status migrainosus, not intractable - Primary     Pt will continue Protriptyline 10mg daily  An injection of Toradol 60mg IM will be given today  If the headache is still present tomorrow morning she will take Decadron  She will call the office if the current headache continues to be a problem  She will follow-up in 4 months  Vertigo     Improved  She will continue with home exercises  Subjective:    HPI    We had the pleasure of evaluating Marisa Dalton in neurological follow up today  As you know she is a 46year old right handed female  She has had a nagging headache since Sunday           Neck pain:         -  She states she is doing better in regards to her headaches       Chronic Migraine headaches:   PREVENTIVE: mag, B2, protriptyline   ABORTIVE: naproxen, tylenol, excedrin migraine, Fioricet- not effective  Decadron, She has noted that just taking tyloneol will abort her headaches   If the headache persists until the next day then she should take a decadron in the morning      Non-Medical/Alternative Treatments used in the past for headaches: none  Headache trigger: stress/tension, fatigue, lack of sleep     How often do the headaches occur -     Migraines- 1-2 a month  TTH- once times a week due to work and she is staring at the computer now  What time of the day do the headaches start - evening  How long do the headaches last - TTH- 2 hours; migraines - 2 hr to all day  Where are they located - TTH/migraines- b/l orbits, bilateral frontal  What is the intensity of pain - TTH- average pain level 2-3/10; migraines- up to 4-5/10  Any warning prior to headache and how long do they last - nausea, previously had floaters prior to headache  Describe your usual headache - TTH- pressure; migraines- sharp  Associated symptoms: nausea, lacrimation, vomiting, prefer to lay in dark room, fatigue        Vertigo: Improved overall  She will have occasional symptoms when she looks up but they resolve  She did attend therapy and has a home exercise program     The following portions of the patient's history were reviewed and updated as appropriate: allergies, current medications, past family history, past medical history, past social history, past surgical history and problem list          Objective:    Blood pressure (!) 127/101, pulse 102, height 5' 7 5" (1 715 m), weight 92 5 kg (204 lb), not currently breastfeeding  Physical Exam   Eyes: Pupils are equal, round, and reactive to light  Lids are normal    Neurological: She is alert  She has normal strength and normal reflexes  Coordination normal    Psychiatric: Her speech is normal    Vitals reviewed  Neurological Exam  Mental Status  Awake and alert  Oriented to person, place, time and situation  Recent and remote memory are intact  Speech is normal  Language is fluent with no aphasia  Attention and concentration are normal  Fund of knowledge is appropriate for level of education  Apraxia absent  Cranial Nerves  CN II: Visual acuity is normal  Visual fields full to confrontation  CN III, IV, VI: Extraocular movements intact bilaterally  Normal lids and orbits bilaterally  Pupils equal round and reactive to light bilaterally  CN V: Facial sensation is normal   CN VII: Full and symmetric facial movement  CN VIII: Hearing is normal   CN IX, X: Palate elevates symmetrically  Normal gag reflex  CN XI: Shoulder shrug strength is normal   CN XII: Tongue midline without atrophy or fasciculations  Motor   Strength is 5/5 throughout all four extremities  Sensory  Sensation is intact to light touch, pinprick, vibration and proprioception in all four extremities  Reflexes  Deep tendon reflexes are 2+ and symmetric in all four extremities with downgoing toes bilaterally  Coordination  Finger-to-nose, rapid alternating movements and heel-to-shin normal bilaterally without dysmetria  Gait  Normal casual, toe, heel and tandem gait  ROS:    Review of Systems   Constitutional: Negative  HENT: Negative  Eyes: Negative  Respiratory: Negative  Cardiovascular: Negative  Gastrointestinal: Negative  Endocrine: Negative  Genitourinary: Negative  Musculoskeletal: Negative  Skin: Negative  Allergic/Immunologic: Negative  Neurological: Positive for dizziness and headaches  Hematological: Negative  Psychiatric/Behavioral: Negative

## 2018-11-07 NOTE — ASSESSMENT & PLAN NOTE
Pt will continue Protriptyline 10mg daily  An injection of Toradol 60mg IM will be given today  If the headache is still present tomorrow morning she will take Decadron  She will call the office if the current headache continues to be a problem  She will follow-up in 4 months

## 2018-11-07 NOTE — PATIENT INSTRUCTIONS
Chronic migraine without aura without status migrainosus, not intractable  Pt will continue Protriptyline 10mg daily  An injection of Toradol 60mg IM will be given today  If the headache is still present tomorrow morning she will take Decadron  She will call the office if the current headache continues to be a problem  She will follow-up in 4 months  Vertigo  Improved  She will continue with home exercises

## 2018-11-28 ENCOUNTER — ANNUAL EXAM (OUTPATIENT)
Dept: OBGYN CLINIC | Facility: CLINIC | Age: 53
End: 2018-11-28
Payer: COMMERCIAL

## 2018-11-28 VITALS
SYSTOLIC BLOOD PRESSURE: 108 MMHG | DIASTOLIC BLOOD PRESSURE: 70 MMHG | BODY MASS INDEX: 32.49 KG/M2 | HEIGHT: 67 IN | WEIGHT: 207 LBS

## 2018-11-28 DIAGNOSIS — Z11.51 SCREENING FOR HUMAN PAPILLOMAVIRUS (HPV): ICD-10-CM

## 2018-11-28 DIAGNOSIS — Z01.419 ENCNTR FOR GYN EXAM (GENERAL) (ROUTINE) W/O ABN FINDINGS: Primary | ICD-10-CM

## 2018-11-28 DIAGNOSIS — N94.6 DYSMENORRHEA: ICD-10-CM

## 2018-11-28 DIAGNOSIS — Z12.31 ENCOUNTER FOR SCREENING MAMMOGRAM FOR MALIGNANT NEOPLASM OF BREAST: ICD-10-CM

## 2018-11-28 PROBLEM — G43.009 MIGRAINE WITHOUT AURA AND WITHOUT STATUS MIGRAINOSUS, NOT INTRACTABLE: Status: RESOLVED | Noted: 2018-03-14 | Resolved: 2018-11-28

## 2018-11-28 PROBLEM — H10.33 ACUTE CONJUNCTIVITIS OF BOTH EYES: Status: RESOLVED | Noted: 2018-04-22 | Resolved: 2018-11-28

## 2018-11-28 PROCEDURE — 87624 HPV HI-RISK TYP POOLED RSLT: CPT | Performed by: OBSTETRICS & GYNECOLOGY

## 2018-11-28 PROCEDURE — G0145 SCR C/V CYTO,THINLAYER,RESCR: HCPCS | Performed by: OBSTETRICS & GYNECOLOGY

## 2018-11-28 PROCEDURE — 99396 PREV VISIT EST AGE 40-64: CPT | Performed by: OBSTETRICS & GYNECOLOGY

## 2018-11-28 RX ORDER — ETODOLAC 500 MG/1
500 TABLET, FILM COATED ORAL 2 TIMES DAILY
Qty: 60 TABLET | Refills: 3 | Status: SHIPPED | OUTPATIENT
Start: 2018-11-28 | End: 2021-06-02

## 2018-11-28 NOTE — PROGRESS NOTES
Esthela Mccullough  1965      CC:  Yearly exam    S:  46 y o  female here for yearly exam  Her cycles are regular, not heavy or crampy  She is sexually active  She uses her tubal for contraception  She does have cramps for which she was using the Etodolac I gave her but needs a new Rx        Last Pap 8/27/2013 - normal/negative HPV  Last Mammo 3/26/2015 - BIRAD-2  Last colonoscopy 9/30/2016 - +polyp; 5 year intervals      Current Outpatient Prescriptions:     albuterol (PROVENTIL HFA,VENTOLIN HFA) 90 mcg/act inhaler, Inhale 2 puffs every 6 (six) hours as needed for wheezing, Disp: , Rfl:     atorvastatin (LIPITOR) 10 mg tablet, Take 1 tablet (10 mg total) by mouth daily, Disp: 90 tablet, Rfl: 2    Biotin 300 MCG TABS, Take 300 mcg by mouth daily  , Disp: , Rfl:     CALCIUM PO, Take by mouth daily  , Disp: , Rfl:     clobetasol (OLUX) 0 05 % topical foam, as needed  , Disp: , Rfl:     lisinopril (ZESTRIL) 5 mg tablet, Take 1 tablet (5 mg total) by mouth daily, Disp: 90 tablet, Rfl: 1    MAGNESIUM PO, Take 1 tablet by mouth daily, Disp: , Rfl:     protriptyline (VIVACTIL) 10 mg tablet, Take 1 tablet (10 mg total) by mouth daily at bedtime, Disp: 30 tablet, Rfl: 11    Riboflavin (B2) 100 MG TABS, Take by mouth daily  , Disp: , Rfl:     VITAMIN D, CHOLECALCIFEROL, PO, Take 2,000 Units by mouth daily  , Disp: , Rfl:   Social History     Social History    Marital status: /Civil Union     Spouse name: N/A    Number of children: N/A    Years of education: N/A     Occupational History     [employer]     Social History Main Topics    Smoking status: Former Smoker     Quit date: 2008    Smokeless tobacco: Never Used      Comment: quit 10 yrs ago    Alcohol use 4 2 oz/week     7 Glasses of wine per week      Comment: 1 glass of wine a day, drinks beer    Drug use: No    Sexual activity: Yes     Partners: Male     Birth control/ protection: Female Sterilization     Other Topics Concern    Not on file     Social History Narrative    Lives with     1 child    Full time employment    Denied:  History of coffee    Daily tea consumption, 1 cup/day    Exercise:  Walking    Exercises moderately less than 3 times per week    Denied:  History of domestic violence         Family History   Problem Relation Age of Onset    Depression Mother     Anxiety disorder Mother     Hypertension Mother     Hypertension Father     Heart attack Maternal Uncle     Alzheimer's disease Maternal Grandmother     Bone cancer Maternal Grandmother     Heart attack Maternal Grandfather     Psoriasis Maternal Grandfather     Stroke Paternal Grandmother         syndrome    Diabetes Paternal Grandfather     Hypertension Brother     Coronary artery disease Paternal Uncle     Psoriasis Brother     No Known Problems Brother       Past Medical History:   Diagnosis Date    Asthma     Elevated AST (SGOT)     Fibromyalgia     Hyperlipemia     Hypertension     Lateral epicondylitis of right elbow     Migraine     Obesity     Ovarian cyst     Sebopsoriasis         O:  Blood pressure 108/70, height 5' 6 5" (1 689 m), weight 93 9 kg (207 lb), last menstrual period 11/18/2018, not currently breastfeeding  Patient appears well and is not in distress  Neck is supple without masses  Breasts are symmetrical without mass, tenderness, nipple discharge, skin changes or adenopathy  Abdomen is soft and nontender without masses  External genitals are normal without lesions or rashes  Vagina is normal without discharge or bleeding  Cervix is normal without discharge or lesion  Uterus is normal, mobile, nontender without palpable mass  Adnexa are normal, nontender, without palpable mass  A:  Yearly exam      P:   Pap done - will call with results   Rx sent for Etodolac   Mammo slip provided    RTO one year for yearly exam or sooner as needed

## 2018-11-29 LAB
HPV HR 12 DNA CVX QL NAA+PROBE: NEGATIVE
HPV16 DNA CVX QL NAA+PROBE: NEGATIVE
HPV18 DNA CVX QL NAA+PROBE: NEGATIVE

## 2018-12-04 LAB
LAB AP GYN PRIMARY INTERPRETATION: NORMAL
LAB AP LMP: NORMAL
Lab: NORMAL

## 2019-03-01 ENCOUNTER — TELEPHONE (OUTPATIENT)
Dept: OBGYN CLINIC | Facility: CLINIC | Age: 54
End: 2019-03-01

## 2019-10-10 ENCOUNTER — OFFICE VISIT (OUTPATIENT)
Dept: NEUROLOGY | Facility: CLINIC | Age: 54
End: 2019-10-10

## 2019-10-10 VITALS
WEIGHT: 211 LBS | HEIGHT: 67 IN | DIASTOLIC BLOOD PRESSURE: 80 MMHG | SYSTOLIC BLOOD PRESSURE: 127 MMHG | HEART RATE: 112 BPM | BODY MASS INDEX: 33.12 KG/M2

## 2019-10-10 DIAGNOSIS — G43.709 CHRONIC MIGRAINE WITHOUT AURA WITHOUT STATUS MIGRAINOSUS, NOT INTRACTABLE: Primary | ICD-10-CM

## 2019-10-10 DIAGNOSIS — R42 VERTIGO: ICD-10-CM

## 2019-10-10 PROCEDURE — 99213 OFFICE O/P EST LOW 20 MIN: CPT | Performed by: PHYSICIAN ASSISTANT

## 2019-10-10 RX ORDER — PROTRIPTYLINE HYDROCHLORIDE 5 MG/1
15 TABLET, FILM COATED ORAL
Qty: 270 TABLET | Refills: 3 | Status: SHIPPED | OUTPATIENT
Start: 2019-10-10 | End: 2020-10-22 | Stop reason: SDUPTHER

## 2019-10-10 NOTE — ASSESSMENT & PLAN NOTE
Pt has been having more migraine headaches requiring increasing doses of Tylenol  Protriptyline will be increased to 15mg daily  She will continue magnesium and B2  She understands that she should not take Tylenol more than 3 days per week  Fifteen minutes were spent with the patient gathering history, examining patient and formulating a treatment plan  Pt/family understood and were in agreement with the treatment plan  She will follow-up in 6 months

## 2019-10-10 NOTE — PROGRESS NOTES
Patient ID: Jess Simmons is a 46 y o  female  Assessment/Plan:    Chronic migraine without aura without status migrainosus, not intractable  Pt has been having more migraine headaches requiring increasing doses of Tylenol  Protriptyline will be increased to 15mg daily  She will continue magnesium and B2  She understands that she should not take Tylenol more than 3 days per week  Fifteen minutes were spent with the patient gathering history, examining patient and formulating a treatment plan  Pt/family understood and were in agreement with the treatment plan  She will follow-up in 6 months  Problem List Items Addressed This Visit        Cardiovascular and Mediastinum    Chronic migraine without aura without status migrainosus, not intractable - Primary     Pt has been having more migraine headaches requiring increasing doses of Tylenol  Protriptyline will be increased to 15mg daily  She will continue magnesium and B2  She understands that she should not take Tylenol more than 3 days per week  Fifteen minutes were spent with the patient gathering history, examining patient and formulating a treatment plan  Pt/family understood and were in agreement with the treatment plan  She will follow-up in 6 months  Relevant Medications    protriptyline (VIVACTIL) 5 MG tablet       Other    Vertigo             Subjective:    HPI    We had the pleasure of evaluating Jess Simmons in neurological follow up today  As you know she is a 46year old right handed female with hypertension and hyperlipidemia  She has been having some headaches that last a few days  She will be moving in the next month and believes that the stress of moving has caused an increase in headaches  She admits to using abortive medications > 3 times per week  Neck pain:         -  She states she is doing better in regards to her headaches        Chronic Migraine headaches:   PREVENTIVE: mag, B2, protriptyline   ABORTIVE: naproxen, tylenol, excedrin migraine, Fioricet- not effective  Decadron, She has noted that just taking tyloneol will abort her headaches  If the headache persists until the next day then she should take a decadron in the morning  Non-Medical/Alternative Treatments used in the past for headaches: none  Headache trigger: stress/tension, fatigue, lack of sleep     How often do the headaches occur -      Migraines- 1-2 a month  TTH- once times a week due to work and she is staring at the computer now  What time of the day do the headaches start - evening  How long do the headaches last - TTH- 2 hours; migraines - 2 hr to a few days  Where are they located - TTH/migraines- b/l orbits, bilateral frontal  What is the intensity of pain - TTH- average pain level 2-3/10; migraines- up to 4-5/10  Any warning prior to headache and how long do they last - nausea, previously had floaters prior to headache  Describe your usual headache - TTH- pressure; migraines- sharp  Associated symptoms: nausea, lacrimation, vomiting, prefer to lay in dark room, fatigue     Vertigo: Resolved  The following portions of the patient's history were reviewed and updated as appropriate: allergies, current medications, past family history, past medical history, past social history, past surgical history and problem list          Objective:    Blood pressure 127/80, pulse (!) 112, height 5' 6 5" (1 689 m), weight 95 7 kg (211 lb), not currently breastfeeding  Physical Exam   Eyes: Pupils are equal, round, and reactive to light  Lids are normal    Neurological: She has normal strength and normal reflexes  Coordination normal    Psychiatric: Her speech is normal    Vitals reviewed  Neurological Exam  Mental Status   Oriented to person, place, time and situation  Recent and remote memory are intact  Speech is normal  Language is fluent with no aphasia   Attention and concentration are normal  Fund of knowledge is appropriate for level of education  Apraxia absent  Cranial Nerves  CN II: Visual acuity is normal  Visual fields full to confrontation  CN III, IV, VI: Extraocular movements intact bilaterally  Normal lids and orbits bilaterally  Pupils equal round and reactive to light bilaterally  CN V: Facial sensation is normal   CN VII: Full and symmetric facial movement  CN VIII: Hearing is normal   CN IX, X: Palate elevates symmetrically  Normal gag reflex  CN XI: Shoulder shrug strength is normal   CN XII: Tongue midline without atrophy or fasciculations  Motor   Strength is 5/5 throughout all four extremities  Sensory  Sensation is intact to light touch, pinprick, vibration and proprioception in all four extremities  Reflexes  Deep tendon reflexes are 2+ and symmetric in all four extremities with downgoing toes bilaterally  Coordination  Finger-to-nose, rapid alternating movements and heel-to-shin normal bilaterally without dysmetria  Gait  Normal casual, toe, heel and tandem gait  ROS:    Review of Systems   Constitutional: Negative  Negative for appetite change and fever  HENT: Negative  Negative for hearing loss, tinnitus, trouble swallowing and voice change  Eyes: Negative  Negative for photophobia and pain  Respiratory: Negative  Negative for shortness of breath  Cardiovascular: Negative  Negative for palpitations  Gastrointestinal: Negative  Negative for nausea and vomiting  Endocrine: Negative  Negative for cold intolerance and heat intolerance  Genitourinary: Negative  Negative for dysuria, frequency and urgency  Musculoskeletal: Negative  Negative for myalgias and neck pain  Skin: Negative  Negative for rash  Neurological: Positive for headaches (1-2 a month)  Negative for dizziness, tremors, seizures, syncope, facial asymmetry, speech difficulty, weakness, light-headedness and numbness  Hematological: Negative  Does not bruise/bleed easily     Psychiatric/Behavioral: Negative  Negative for confusion, hallucinations and sleep disturbance  Review of systems personally reviewed

## 2019-10-10 NOTE — PATIENT INSTRUCTIONS
Chronic migraine without aura without status migrainosus, not intractable  Pt has been having more migraine headaches requiring increasing doses of Tylenol  Protriptyline will be increased to 15mg daily  She will continue magnesium and B2  She understands that she should not take Tylenol more than 3 days per week  Fifteen minutes were spent with the patient gathering history, examining patient and formulating a treatment plan  Pt/family understood and were in agreement with the treatment plan  She will follow-up in 6 months

## 2020-04-16 ENCOUNTER — OFFICE VISIT (OUTPATIENT)
Dept: NEUROLOGY | Facility: CLINIC | Age: 55
End: 2020-04-16

## 2020-04-16 VITALS
BODY MASS INDEX: 34.66 KG/M2 | WEIGHT: 218 LBS | HEART RATE: 114 BPM | SYSTOLIC BLOOD PRESSURE: 138 MMHG | DIASTOLIC BLOOD PRESSURE: 84 MMHG

## 2020-04-16 DIAGNOSIS — G43.709 CHRONIC MIGRAINE WITHOUT AURA WITHOUT STATUS MIGRAINOSUS, NOT INTRACTABLE: Primary | ICD-10-CM

## 2020-04-16 DIAGNOSIS — R42 VERTIGO: ICD-10-CM

## 2020-04-16 PROCEDURE — 99214 OFFICE O/P EST MOD 30 MIN: CPT | Performed by: PHYSICIAN ASSISTANT

## 2020-04-16 PROCEDURE — 1036F TOBACCO NON-USER: CPT | Performed by: PHYSICIAN ASSISTANT

## 2020-04-16 PROCEDURE — 3075F SYST BP GE 130 - 139MM HG: CPT | Performed by: PHYSICIAN ASSISTANT

## 2020-04-16 PROCEDURE — 3079F DIAST BP 80-89 MM HG: CPT | Performed by: PHYSICIAN ASSISTANT

## 2020-04-16 RX ORDER — DEXAMETHASONE 2 MG/1
2 TABLET ORAL AS NEEDED
Qty: 5 TABLET | Refills: 0 | Status: SHIPPED | OUTPATIENT
Start: 2020-04-16 | End: 2020-10-22 | Stop reason: SDUPTHER

## 2020-09-28 ENCOUNTER — TELEPHONE (OUTPATIENT)
Dept: FAMILY MEDICINE CLINIC | Facility: CLINIC | Age: 55
End: 2020-09-28

## 2020-09-28 NOTE — TELEPHONE ENCOUNTER
I spoke to patient and asked if we are still her PCP  She states she transferred care in 2019 to [address] [place] [address], Lilli Pena, DO     Please remove PCP

## 2020-09-29 NOTE — TELEPHONE ENCOUNTER
09/29/20 10:18 AM     Thank you for your request  Your request has been received, reviewed, and the patient chart updated  The PCP has successfully been removed with a patient attribution note  This message will now be completed      Thank you  Korina Jonas

## 2020-10-22 ENCOUNTER — OFFICE VISIT (OUTPATIENT)
Dept: NEUROLOGY | Facility: CLINIC | Age: 55
End: 2020-10-22

## 2020-10-22 VITALS
WEIGHT: 231 LBS | HEIGHT: 68 IN | BODY MASS INDEX: 35.01 KG/M2 | DIASTOLIC BLOOD PRESSURE: 80 MMHG | SYSTOLIC BLOOD PRESSURE: 156 MMHG | TEMPERATURE: 96.6 F | RESPIRATION RATE: 16 BRPM | HEART RATE: 95 BPM

## 2020-10-22 DIAGNOSIS — R42 VERTIGO: ICD-10-CM

## 2020-10-22 DIAGNOSIS — G43.709 CHRONIC MIGRAINE WITHOUT AURA WITHOUT STATUS MIGRAINOSUS, NOT INTRACTABLE: Primary | ICD-10-CM

## 2020-10-22 PROBLEM — G44.40 MEDICATION OVERUSE HEADACHE: Status: ACTIVE | Noted: 2020-10-22

## 2020-10-22 PROCEDURE — 99213 OFFICE O/P EST LOW 20 MIN: CPT | Performed by: PHYSICIAN ASSISTANT

## 2020-10-22 RX ORDER — OLANZAPINE 5 MG/1
5 TABLET ORAL
Qty: 5 TABLET | Refills: 0 | Status: SHIPPED | OUTPATIENT
Start: 2020-10-22 | End: 2021-06-02 | Stop reason: ALTCHOICE

## 2020-10-22 RX ORDER — DEXAMETHASONE 2 MG/1
2 TABLET ORAL AS NEEDED
Qty: 5 TABLET | Refills: 0 | Status: SHIPPED | OUTPATIENT
Start: 2020-10-22

## 2021-06-02 ENCOUNTER — OFFICE VISIT (OUTPATIENT)
Dept: NEUROLOGY | Facility: CLINIC | Age: 56
End: 2021-06-02

## 2021-06-02 VITALS
HEART RATE: 68 BPM | WEIGHT: 216 LBS | DIASTOLIC BLOOD PRESSURE: 84 MMHG | HEIGHT: 68 IN | SYSTOLIC BLOOD PRESSURE: 114 MMHG | BODY MASS INDEX: 32.74 KG/M2

## 2021-06-02 DIAGNOSIS — G43.709 CHRONIC MIGRAINE WITHOUT AURA WITHOUT STATUS MIGRAINOSUS, NOT INTRACTABLE: Primary | ICD-10-CM

## 2021-06-02 PROCEDURE — 99213 OFFICE O/P EST LOW 20 MIN: CPT | Performed by: PHYSICIAN ASSISTANT

## 2021-06-02 NOTE — PATIENT INSTRUCTIONS
Chronic migraine without aura without status migrainosus, not intractable  · Pt reports overall good control of migraines  · She will continue protriptyline 20mg daily  · She does use dexamethasone for more severe headaches but has not needed to use it since the last appt  I have spent 20 minutes with Patient  today in which greater than 50% of this time was spent in counseling/coordination of care regarding Diagnostic results, Prognosis, Risks and benefits of tx options, Intructions for management, Patient and family education, Importance of tx compliance, Risk factor reductions and Impressions  She will follow-up in 6 months  Tension type headache  · Tension headaches have lessened as well  · She can continue to take Tylenol as needed but not more than 3 times per week

## 2021-06-02 NOTE — ASSESSMENT & PLAN NOTE
· Tension headaches have lessened as well  · She can continue to take Tylenol as needed but not more than 3 times per week

## 2021-06-02 NOTE — PROGRESS NOTES
Patient ID: Neetu Bashir is a 63 y o  female  Assessment/Plan:    Chronic migraine without aura without status migrainosus, not intractable  · Pt reports overall good control of migraines  · She will continue protriptyline 20mg daily  · She does use dexamethasone for more severe headaches but has not needed to use it since the last appt  I have spent 20 minutes with Patient  today in which greater than 50% of this time was spent in counseling/coordination of care regarding Diagnostic results, Prognosis, Risks and benefits of tx options, Intructions for management, Patient and family education, Importance of tx compliance, Risk factor reductions and Impressions  She will follow-up in 6 months  Tension type headache  · Tension headaches have lessened as well  · She can continue to take Tylenol as needed but not more than 3 times per week  Problem List Items Addressed This Visit        Cardiovascular and Mediastinum    Chronic migraine without aura without status migrainosus, not intractable - Primary     · Pt reports overall good control of migraines  · She will continue protriptyline 20mg daily  · She does use dexamethasone for more severe headaches but has not needed to use it since the last appt  I have spent 20 minutes with Patient  today in which greater than 50% of this time was spent in counseling/coordination of care regarding Diagnostic results, Prognosis, Risks and benefits of tx options, Intructions for management, Patient and family education, Importance of tx compliance, Risk factor reductions and Impressions  She will follow-up in 6 months  Subjective:    HPI    We had the pleasure of evaluating Neetu Bashir in neurological follow up today  As you know she is a 63year old right handed female  She also has HTN, hyperlipidemia, vitamin D deficiency and asthma  Both migraine and tension headaches have improved since her last appt   She had some medication overuse headaches at her last appt  She did try olanzapine for 5 days which was helpful  She remains on protriptyline 20mg daily, magnesium and vitamin B2 for prevention of migraines  Neck pain:   -  She states she is doing better in regards to her headaches  Chronic Migraine headaches:   PREVENTIVE: mag, B2, protriptyline   ABORTIVE: naproxen, tylenol, excedrin migraine, Fioricet- not effective  Decadron, She has noted that just taking tyloneol will abort her headaches  If the headache persists until the next day then she should take a decadron in the morning  Non-Medical/Alternative Treatments used in the past for headaches: none  Headache trigger: stress/tension, fatigue, lack of sleep     How often do the headaches occur -    Migraines- no migraines since the last appt  TTH- 1 every few weeks  What time of the day do the headaches start - start at work  How long do the headaches last - TTH- 30 minutes; migraines - 2 hr to all day  Where are they located - TTH/migraines- b/l orbits, bilateral frontal  What is the intensity of pain - TTH- average pain level 1-2/10; migraines- up to 6-7/10  Any warning prior to headache and how long do they last - nausea, previously had floaters prior to headache  Describe your usual headache - TTH- pressure; migraines- sharp  Associated symptoms: nausea, lacrimation, vomiting, prefer to lay in dark room, fatigue     Vertigo: Improved overall  She did attend therapy and has a home exercise program  She does report some nausea when driving on winding roads  Started Claritin which has helped  The following portions of the patient's history were reviewed and updated as appropriate: allergies, current medications, past family history, past medical history, past social history, past surgical history and problem list          Objective:    Blood pressure 114/84, pulse 68, height 5' 8" (1 727 m), weight 98 kg (216 lb), not currently breastfeeding      Physical Exam  Vitals signs reviewed  Eyes:      General: Lids are normal       Extraocular Movements: Extraocular movements intact  Pupils: Pupils are equal, round, and reactive to light  Neurological:      Coordination: Coordination is intact  Deep Tendon Reflexes: Strength normal and reflexes are normal and symmetric  Psychiatric:         Speech: Speech normal          Neurological Exam  Mental Status   Recent and remote memory are intact  Speech is normal  Language is fluent with no aphasia  Attention and concentration are normal  Fund of knowledge is appropriate for level of education  Apraxia absent  Cranial Nerves  CN II: Visual acuity is normal  Visual fields full to confrontation  CN III, IV, VI: Extraocular movements intact bilaterally  Normal lids and orbits bilaterally  Pupils equal round and reactive to light bilaterally  CN V: Facial sensation is normal   CN VII: Full and symmetric facial movement  CN VIII: Hearing is normal   CN IX, X: Palate elevates symmetrically  Normal gag reflex  CN XI: Shoulder shrug strength is normal   CN XII: Tongue midline without atrophy or fasciculations  Motor   Strength is 5/5 throughout all four extremities  Sensory  Sensation is intact to light touch, pinprick, vibration and proprioception in all four extremities  Reflexes  Deep tendon reflexes are 2+ and symmetric in all four extremities with downgoing toes bilaterally  Coordination  Finger-to-nose, rapid alternating movements and heel-to-shin normal bilaterally without dysmetria  Gait  Normal casual, toe, heel and tandem gait  ROS:    Review of Systems   Constitutional: Negative  Negative for appetite change and fever  HENT: Negative  Negative for hearing loss, tinnitus, trouble swallowing and voice change  Eyes: Negative  Negative for photophobia and pain  Respiratory: Negative  Negative for shortness of breath  Cardiovascular: Negative  Negative for palpitations     Gastrointestinal: Negative  Negative for nausea and vomiting  Endocrine: Negative  Negative for cold intolerance  Genitourinary: Negative  Negative for dysuria, frequency and urgency  Musculoskeletal: Negative  Negative for myalgias and neck pain  Skin: Negative  Negative for rash  Allergic/Immunologic: Negative  Neurological: Negative  Negative for dizziness, tremors, seizures, syncope, facial asymmetry, speech difficulty, weakness, light-headedness, numbness and headaches  Hematological: Negative  Does not bruise/bleed easily  Psychiatric/Behavioral: Negative  Negative for confusion, hallucinations and sleep disturbance  All other systems reviewed and are negative  Review of systems personally reviewed

## 2021-06-02 NOTE — ASSESSMENT & PLAN NOTE
· Pt reports overall good control of migraines  · She will continue protriptyline 20mg daily  · She does use dexamethasone for more severe headaches but has not needed to use it since the last appt  I have spent 20 minutes with Patient  today in which greater than 50% of this time was spent in counseling/coordination of care regarding Diagnostic results, Prognosis, Risks and benefits of tx options, Intructions for management, Patient and family education, Importance of tx compliance, Risk factor reductions and Impressions  She will follow-up in 6 months

## 2022-04-07 DIAGNOSIS — G43.709 CHRONIC MIGRAINE WITHOUT AURA WITHOUT STATUS MIGRAINOSUS, NOT INTRACTABLE: ICD-10-CM

## 2022-04-07 NOTE — TELEPHONE ENCOUNTER
patient of Teto Greenwood, last visit 6/2/2021, note documents: She will continue protriptyline 20mg daily  calling for refill protriptyline, #90 day supply  Due for refill; please sign script if in agreement      [address] [facility]    I scheduled f/u 8/10

## 2022-08-10 ENCOUNTER — OFFICE VISIT (OUTPATIENT)
Dept: NEUROLOGY | Facility: CLINIC | Age: 57
End: 2022-08-10

## 2022-08-10 VITALS
OXYGEN SATURATION: 98 % | SYSTOLIC BLOOD PRESSURE: 132 MMHG | WEIGHT: 226 LBS | DIASTOLIC BLOOD PRESSURE: 80 MMHG | BODY MASS INDEX: 34.25 KG/M2 | TEMPERATURE: 98.1 F | HEIGHT: 68 IN | HEART RATE: 96 BPM

## 2022-08-10 DIAGNOSIS — G43.709 CHRONIC MIGRAINE WITHOUT AURA WITHOUT STATUS MIGRAINOSUS, NOT INTRACTABLE: Primary | ICD-10-CM

## 2022-08-10 PROCEDURE — 99214 OFFICE O/P EST MOD 30 MIN: CPT | Performed by: PHYSICIAN ASSISTANT

## 2022-08-10 RX ORDER — DEXAMETHASONE 2 MG/1
2 TABLET ORAL AS NEEDED
Qty: 5 TABLET | Refills: 0 | Status: SHIPPED | OUTPATIENT
Start: 2022-08-10

## 2022-08-10 RX ORDER — ASPIRIN 81 MG/1
81 TABLET ORAL
COMMUNITY

## 2022-08-10 NOTE — ASSESSMENT & PLAN NOTE
· Continue protriptyline and B2  She will resume magnesium as well  · She can continue Tylenol as needed but no more than 3 days per week  · She can use dexamethasone for severe migraines not relieved with Tylenol  I have spent 30 minutes with Patient  today in which greater than 50% of this time was spent in counseling/coordination of care regarding Diagnostic results, Prognosis, Risks and benefits of tx options, Intructions for management, Patient and family education, Importance of tx compliance, Risk factor reductions and Impressions  She will follow-up in 6 months

## 2022-08-10 NOTE — PATIENT INSTRUCTIONS
Chronic migraine without aura without status migrainosus, not intractable  Continue protriptyline and B2  She will resume magnesium as well  She can continue Tylenol as needed but no more than 3 days per week  She can use dexamethasone for severe migraines not relieved with Tylenol  I have spent 30 minutes with Patient  today in which greater than 50% of this time was spent in counseling/coordination of care regarding Diagnostic results, Prognosis, Risks and benefits of tx options, Intructions for management, Patient and family education, Importance of tx compliance, Risk factor reductions and Impressions  She will follow-up in 6 months

## 2022-08-10 NOTE — PROGRESS NOTES
Patient ID: Kayley Jacob is a 61 y o  female  Assessment/Plan:    Chronic migraine without aura without status migrainosus, not intractable  · Continue protriptyline and B2  She will resume magnesium as well  · She can continue Tylenol as needed but no more than 3 days per week  · She can use dexamethasone for severe migraines not relieved with Tylenol  I have spent 30 minutes with Patient  today in which greater than 50% of this time was spent in counseling/coordination of care regarding Diagnostic results, Prognosis, Risks and benefits of tx options, Intructions for management, Patient and family education, Importance of tx compliance, Risk factor reductions and Impressions  She will follow-up in 6 months  Problem List Items Addressed This Visit        Cardiovascular and Mediastinum    Chronic migraine without aura without status migrainosus, not intractable - Primary     · Continue protriptyline and B2  She will resume magnesium as well  · She can continue Tylenol as needed but no more than 3 days per week  · She can use dexamethasone for severe migraines not relieved with Tylenol  I have spent 30 minutes with Patient  today in which greater than 50% of this time was spent in counseling/coordination of care regarding Diagnostic results, Prognosis, Risks and benefits of tx options, Intructions for management, Patient and family education, Importance of tx compliance, Risk factor reductions and Impressions  She will follow-up in 6 months  Relevant Medications    aspirin (ECOTRIN LOW STRENGTH) 81 mg EC tablet    dexamethasone (DECADRON) 2 mg tablet             Subjective:    HPI    We had the pleasure of evaluating Kayley Jacob in neurological follow up today  As you know she is a 61year old right handed female  She also has HTN, hyperlipidemia, vitamin D deficiency and asthma  She reports worsening of migraines and tension headaches in the past month   She attributes the worsening to an increased amount of stress at work  She stopped magnesium at some point  She is taking protriptyline and B2 without side effects  She will take Tylenol or dexamethsone for acute migraines  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 with [facility]  Biopsy results were benign  Chronic Migraine headaches:   PREVENTIVE: mag, B2, protriptyline   ABORTIVE: naproxen, tylenol, excedrin migraine, Fioricet- not effective  Decadron, She has noted that just taking tyloneol will abort her headaches  If the headache persists until the next day then she should take a decadron in the morning  Non-Medical/Alternative Treatments used in the past for headaches: none  Headache trigger: stress/tension, fatigue, lack of sleep     How often do the headaches occur -    Migraines- 1 migraine in the past month  TTH- daily  What time of the day do the headaches start - start at work  How long do the headaches last - TTH- last all day; migraines - 2 hr to all day  Where are they located - TTH/migraines- b/l orbits, bilateral frontal  What is the intensity of pain - TTH- average pain level 5-6/10; migraines- up to 6-7/10  Any warning prior to headache and how long do they last - nausea, previously had floaters prior to headache  Describe your usual headache - TTH- pressure; migraines- sharp  Associated symptoms: nausea, lacrimation, vomiting, prefer to lay in dark room, fatigue, vision impairment     Vertigo: Improved overall  She did attend therapy and has a home exercise program  No events since her last appt      The following portions of the patient's history were reviewed and updated as appropriate: allergies, current medications, past family history, past medical history, past social history, past surgical history and problem list          Objective:    Blood pressure 132/80, pulse 96, temperature 98 1 °F (36 7 °C), temperature source Temporal, height 5' 8" (1 727 m), weight 103 kg (226 lb), SpO2 98 %, not currently breastfeeding  Physical Exam  Vitals reviewed  Eyes:      General: Lids are normal       Extraocular Movements: Extraocular movements intact  Pupils: Pupils are equal, round, and reactive to light  Neurological:      Coordination: Coordination is intact  Deep Tendon Reflexes: Strength normal and reflexes are normal and symmetric  Psychiatric:         Speech: Speech normal          Neurological Exam  Mental Status   Oriented to person, place, time and situation  Recent and remote memory are intact  Speech is normal  Language is fluent with no aphasia  Attention and concentration are normal  Fund of knowledge is appropriate for level of education  Apraxia absent  Cranial Nerves  CN II: Visual acuity is normal  Visual fields full to confrontation  CN III, IV, VI: Extraocular movements intact bilaterally  Normal lids and orbits bilaterally  Pupils equal round and reactive to light bilaterally  CN V: Facial sensation is normal   CN VII: Full and symmetric facial movement  CN VIII: Hearing is normal   CN IX, X: Palate elevates symmetrically  Normal gag reflex  CN XI: Shoulder shrug strength is normal   CN XII: Tongue midline without atrophy or fasciculations  Motor   Strength is 5/5 throughout all four extremities  Sensory  Sensation is intact to light touch, pinprick, vibration and proprioception in all four extremities  Reflexes  Deep tendon reflexes are 2+ and symmetric in all four extremities  Coordination    Finger-to-nose, rapid alternating movements and heel-to-shin normal bilaterally without dysmetria  Gait  Normal casual, toe, heel and tandem gait  ROS:    Review of Systems   Constitutional: Negative  Negative for appetite change and fever  HENT: Negative  Negative for hearing loss, tinnitus, trouble swallowing and voice change  Eyes: Positive for visual disturbance  Negative for photophobia and pain  Respiratory: Negative  Negative for shortness of breath  Cardiovascular: Negative  Negative for palpitations  Gastrointestinal: Negative  Negative for nausea and vomiting  Endocrine: Negative  Negative for cold intolerance  Genitourinary: Negative  Negative for dysuria, frequency and urgency  Musculoskeletal: Negative  Negative for myalgias and neck pain  Skin: Negative  Negative for rash  Neurological: Positive for headaches  Negative for dizziness, tremors, seizures, syncope, facial asymmetry, speech difficulty, weakness, light-headedness and numbness  Hematological: Negative  Does not bruise/bleed easily  Psychiatric/Behavioral: Negative  Negative for confusion, hallucinations and sleep disturbance  Review of systems personally reviewed